# Patient Record
Sex: FEMALE | Race: WHITE | NOT HISPANIC OR LATINO | Employment: FULL TIME | URBAN - NONMETROPOLITAN AREA
[De-identification: names, ages, dates, MRNs, and addresses within clinical notes are randomized per-mention and may not be internally consistent; named-entity substitution may affect disease eponyms.]

---

## 2017-01-04 ENCOUNTER — TRANSCRIBE ORDERS (OUTPATIENT)
Dept: ADMINISTRATIVE | Facility: HOSPITAL | Age: 37
End: 2017-01-04

## 2017-01-16 ENCOUNTER — OFFICE VISIT (OUTPATIENT)
Dept: OBSTETRICS AND GYNECOLOGY | Facility: CLINIC | Age: 37
End: 2017-01-16

## 2017-01-16 VITALS
DIASTOLIC BLOOD PRESSURE: 88 MMHG | SYSTOLIC BLOOD PRESSURE: 134 MMHG | WEIGHT: 199 LBS | BODY MASS INDEX: 31.23 KG/M2 | HEIGHT: 67 IN

## 2017-01-16 DIAGNOSIS — N97.0 INFERTILITY ASSOCIATED WITH ANOVULATION: ICD-10-CM

## 2017-01-16 DIAGNOSIS — E28.2 PCOS (POLYCYSTIC OVARIAN SYNDROME): Primary | ICD-10-CM

## 2017-01-16 PROCEDURE — 99213 OFFICE O/P EST LOW 20 MIN: CPT | Performed by: OBSTETRICS & GYNECOLOGY

## 2017-01-16 NOTE — MR AVS SNAPSHOT
Marcy FELDER Brent   1/16/2017 2:45 PM   Office Visit    Dept Phone:  [phone]   Encounter #:  [id number]    Provider:  Gabriel Soliman MD   Department:  [facility]                Your Full Care Plan              Today's Medication Changes          These changes are accurate as of: 1/16/17  3:22 PM.  If you have any questions, ask your nurse or doctor.               New Medication(s)Ordered:     clomiPHENE 50 MG tablet   Commonly known as:  CLOMID   Take 1 tablet by mouth Daily. Indications: Take on cycle days 3 through 7   Started by:  Gabriel Soliman MD            Where to Get Your Medications      These medications were sent to [facility] [id number] [facility] [place], [place] - [address] AT [facility] [address] - [phone]  - [phone] FX  [address], [place] [address]     Phone:  [phone]     clomiPHENE 50 MG tablet                  Your Updated Medication List          This list is accurate as of: 1/16/17  3:22 PM.  Always use your most recent med list.                amLODIPine 5 MG tablet   Commonly known as:  NORVASC       clomiPHENE 50 MG tablet   Commonly known as:  CLOMID   Take 1 tablet by mouth Daily. Indications: Take on cycle days 3 through 7       labetalol 100 MG tablet   Commonly known as:  NORMODYNE       prenatal vitamin 27-0.8 27-0.8 MG tablet tablet               You Were Diagnosed With        Codes Comments    PCOS (polycystic ovarian syndrome)    -  Primary ICD-10-CM: E28.2  ICD-9-CM: 256.4     Infertility associated with anovulation     ICD-10-CM: N97.0  ICD-9-CM: 628.0       Instructions     None    Patient Instructions History      Upcoming Appointments     Visit Type Date Time Department    OFFICE VISIT 1/16/2017  2:45 PM DEVON NICHOLSON    OFFICE VISIT 4/17/2017  1:00 PM DEVON Villagran Signup     [facility] MyChart allows you to send messages to your doctor, view your test  "results, renew your prescriptions, schedule appointments, and more. To sign up, go to [employer] and click on the Sign Up Now link in the New User? box. Enter your [employer] Activation Code exactly as it appears below along with the last four digits of your Social Security Number and your Date of Birth () to complete the sign-up process. If you do not sign up before the expiration date, you must request a new code.    [employer] Activation Code: [email or web address]  Expires: 2017  4:33 AM    If you have questions, you can email [email or web address] or call [phone] to talk to our [employer] staff. Remember, [employer] is NOT to be used for urgent needs. For medical emergencies, dial 911.               Other Info from Your Visit           Your Appointments     2017  1:00 PM CDT   Office Visit with Gabriel Soliman MD   [facility] (--)    [address] [place] [id number] [phone]           Arrive 15 minutes prior to appointment.              Allergies     Aleve [Naproxen Sodium]        Reason for Visit     Infertility Pt and  here to discuss semen analysis results      Vital Signs     Blood Pressure Height Weight Last Menstrual Period Breastfeeding? Body Mass Index    134/88 67\" (170.2 cm) 199 lb (90.3 kg) 2016 No 31.17 kg/m2    Smoking Status                   Never Smoker           Problems and Diagnoses Noted     PCOS (polycystic ovarian syndrome)    -  Primary    Infertility associated with anovulation            "

## 2017-01-16 NOTE — PROGRESS NOTES
Subjective   Marcy Kennedy is a 36 y.o. female is here today for follow-up.  Semen analysis normal.  Reviewed history and plan again.  U/S and cycle hx lately c/w PCOS.  15 minutes spent with patient; all spent in counseling.    History of Present Illness    The following portions of the patient's history were reviewed and updated as appropriate: allergies, current medications, past family history, past medical history, past social history, past surgical history and problem list.    Review of Systems   Constitutional: Negative for fever and unexpected weight change.   HENT: Negative for postnasal drip.    Eyes: Negative for photophobia and visual disturbance.   Respiratory: Negative for cough, choking and chest tightness.    Cardiovascular: Negative for chest pain and leg swelling.   Gastrointestinal: Negative for blood in stool and constipation.   Endocrine: Negative for cold intolerance and heat intolerance.   Genitourinary: Positive for menstrual problem.   Musculoskeletal: Negative for back pain and gait problem.   Neurological: Negative for light-headedness, numbness and headaches.   Psychiatric/Behavioral: Negative for decreased concentration and dysphoric mood.       Objective   Physical Exam   Nursing note and vitals reviewed.        Assessment/Plan   Marcy was seen today for infertility.    Diagnoses and all orders for this visit:    PCOS (polycystic ovarian syndrome)    Infertility associated with anovulation    Other orders  -     clomiPHENE (CLOMID) 50 MG tablet; Take 1 tablet by mouth Daily. Indications: Take on cycle days 3 through 7

## 2017-04-17 ENCOUNTER — OFFICE VISIT (OUTPATIENT)
Dept: OBSTETRICS AND GYNECOLOGY | Facility: CLINIC | Age: 37
End: 2017-04-17

## 2017-04-17 VITALS
SYSTOLIC BLOOD PRESSURE: 120 MMHG | BODY MASS INDEX: 30.13 KG/M2 | WEIGHT: 192 LBS | DIASTOLIC BLOOD PRESSURE: 84 MMHG | HEIGHT: 67 IN

## 2017-04-17 DIAGNOSIS — Z78.9 NON-SMOKER: ICD-10-CM

## 2017-04-17 DIAGNOSIS — E28.2 PCOS (POLYCYSTIC OVARIAN SYNDROME): Primary | ICD-10-CM

## 2017-04-17 PROCEDURE — 99213 OFFICE O/P EST LOW 20 MIN: CPT | Performed by: OBSTETRICS & GYNECOLOGY

## 2017-04-17 NOTE — PROGRESS NOTES
Subjective   Marcy Kennedy is a 37 y.o. female is here today for follow-up.  Has taken 3 cycles of clomid, currently CD 10 of 3rd, with good results in terms of positive predictor kits and spontaneous menses but no pregnancy.  Discussed adding u/s to this cycle to determine quality of follicle as well endometrial thickness.    Infertility   This is a chronic problem. The current episode started more than 1 month ago. The problem occurs daily. The problem has been unchanged. Pertinent negatives include no abdominal pain, anorexia, arthralgias, change in bowel habit, chest pain, chills, congestion, coughing, diaphoresis, fatigue, fever, headaches, joint swelling, myalgias, nausea, neck pain, numbness, rash, sore throat, swollen glands, urinary symptoms, vertigo, visual change, vomiting or weakness. Nothing aggravates the symptoms. Treatments tried: clomid. The treatment provided no relief.       The following portions of the patient's history were reviewed and updated as appropriate: allergies, current medications, past family history, past medical history, past social history, past surgical history and problem list.    Review of Systems   Constitutional: Negative for chills, diaphoresis, fatigue and fever.   HENT: Negative for congestion and sore throat.    Respiratory: Negative for cough.    Cardiovascular: Negative for chest pain.   Gastrointestinal: Negative for abdominal pain, anorexia, change in bowel habit, nausea and vomiting.   Musculoskeletal: Negative for arthralgias, joint swelling, myalgias and neck pain.   Skin: Negative for rash.   Neurological: Negative for vertigo, weakness, numbness and headaches.       Objective   Physical Exam   Constitutional: She is oriented to person, place, and time. She appears well-developed and well-nourished.   Neck: Normal range of motion. Neck supple. No tracheal deviation present. No thyromegaly present.   Cardiovascular: Normal rate.    Pulmonary/Chest: Effort normal.    Abdominal: Soft. Bowel sounds are normal.   Neurological: She is alert and oriented to person, place, and time.   Skin: Skin is warm and dry.   Psychiatric: She has a normal mood and affect. Her behavior is normal. Judgment and thought content normal.   Nursing note and vitals reviewed.        Assessment/Plan   Marcy was seen today for infertility.    Diagnoses and all orders for this visit:    PCOS (polycystic ovarian syndrome)    Non-smoker      U/S this week to determine quality of follicle and EML.  Could consider adding IUI to this cycle if couple desires.    Gabriel Soliman MD

## 2017-04-20 ENCOUNTER — PROCEDURE VISIT (OUTPATIENT)
Dept: OBSTETRICS AND GYNECOLOGY | Facility: CLINIC | Age: 37
End: 2017-04-20

## 2017-04-20 DIAGNOSIS — E28.2 PCOS (POLYCYSTIC OVARIAN SYNDROME): Primary | ICD-10-CM

## 2017-04-20 PROCEDURE — 76830 TRANSVAGINAL US NON-OB: CPT | Performed by: OBSTETRICS & GYNECOLOGY

## 2017-05-19 ENCOUNTER — PROCEDURE VISIT (OUTPATIENT)
Dept: OBSTETRICS AND GYNECOLOGY | Facility: CLINIC | Age: 37
End: 2017-05-19

## 2017-05-19 DIAGNOSIS — E28.2 PCOS (POLYCYSTIC OVARIAN SYNDROME): Primary | ICD-10-CM

## 2017-05-19 PROCEDURE — 76830 TRANSVAGINAL US NON-OB: CPT | Performed by: OBSTETRICS & GYNECOLOGY

## 2017-05-23 DIAGNOSIS — N97.9 INFERTILITY, FEMALE: Primary | ICD-10-CM

## 2017-05-28 ENCOUNTER — RESULTS ENCOUNTER (OUTPATIENT)
Dept: OBSTETRICS AND GYNECOLOGY | Facility: CLINIC | Age: 37
End: 2017-05-28

## 2017-05-28 DIAGNOSIS — N97.9 INFERTILITY, FEMALE: ICD-10-CM

## 2017-06-21 ENCOUNTER — PROCEDURE VISIT (OUTPATIENT)
Dept: OBSTETRICS AND GYNECOLOGY | Facility: CLINIC | Age: 37
End: 2017-06-21

## 2017-06-21 DIAGNOSIS — E28.2 PCOS (POLYCYSTIC OVARIAN SYNDROME): Primary | ICD-10-CM

## 2017-06-21 DIAGNOSIS — N97.9 INFERTILITY, FEMALE: Primary | ICD-10-CM

## 2017-06-21 PROCEDURE — 76830 TRANSVAGINAL US NON-OB: CPT | Performed by: OBSTETRICS & GYNECOLOGY

## 2017-06-23 ENCOUNTER — OFFICE VISIT (OUTPATIENT)
Dept: OBSTETRICS AND GYNECOLOGY | Facility: CLINIC | Age: 37
End: 2017-06-23

## 2017-06-23 VITALS
SYSTOLIC BLOOD PRESSURE: 124 MMHG | WEIGHT: 188 LBS | HEIGHT: 67 IN | BODY MASS INDEX: 29.51 KG/M2 | DIASTOLIC BLOOD PRESSURE: 88 MMHG

## 2017-06-23 DIAGNOSIS — E28.2 PCOS (POLYCYSTIC OVARIAN SYNDROME): ICD-10-CM

## 2017-06-23 DIAGNOSIS — Z78.9 NON-SMOKER: ICD-10-CM

## 2017-06-23 DIAGNOSIS — N97.9 INFERTILITY, FEMALE: Primary | ICD-10-CM

## 2017-06-23 PROCEDURE — 58322 ARTIFICIAL INSEMINATION: CPT | Performed by: OBSTETRICS & GYNECOLOGY

## 2017-06-23 NOTE — PROGRESS NOTES
"Subjective   Chief Complaint   Patient presents with   • artificial insemenation     pt here today for AI. pt voices no concerns.      Marcy Kennedy is a 37 y.o. year old .  Patient's last menstrual period was 2017.  She presents to be seen for first IUI.  Patient currently on cycle day 15.  She did an hcg trigger 48 hours ago.     The following portions of the patient's history were reviewed and updated as appropriate:current medications, allergies and past surgical history    Smoking status: Never Smoker                                                              Smokeless status: Never Used                        Review of Systems   Respiratory: Negative for shortness of breath.    Cardiovascular: Negative for chest pain.   Gastrointestinal: Negative for abdominal pain.   Genitourinary: Negative for vaginal bleeding.         Objective   /88  Ht 67\" (170.2 cm)  Wt 188 lb (85.3 kg)  LMP 2017  BMI 29.44 kg/m2    Physical Exam   Constitutional: She is oriented to person, place, and time. She appears well-developed and well-nourished. No distress.   HENT:   Head: Normocephalic and atraumatic.   Pulmonary/Chest: Effort normal.   Abdominal: Soft. There is no tenderness.   Genitourinary: Vagina normal. Pelvic exam was performed with patient supine. There is no tenderness or lesion on the right labia. There is no tenderness or lesion on the left labia. Cervix exhibits no motion tenderness, no discharge and no friability. No tenderness or bleeding in the vagina. No signs of injury around the vagina. No vaginal discharge found.   Genitourinary Comments: Cx normal in appearance.  IUI catheter inserted into uterus without any difficulty, no tenaculum necessary.  Patient tolerated well.   Neurological: She is alert and oriented to person, place, and time.   Skin: Skin is warm and dry.   Psychiatric: She has a normal mood and affect. Her behavior is normal.   Nursing note and vitals " reviewed.      Lab Review   No data reviewed    Imaging   No data reviewed     Assessment & Plan    Marcy was seen today for artificial insemenation.    Diagnoses and all orders for this visit:    Infertility, female: Patient using clomid 50 mg.  She is on cycle day 15.  This is her first IUI.  Procedure performed without any difficulty.  Sperm inspected under microscope after processing and specimen had many motile sperm.  Patient instructed not to use a home UPT until day 35.    PCOS (polycystic ovarian syndrome)    Non-smoker      This note was electronically signed.    Carito Kelly MD  June 23, 2017  10:20 AM

## 2017-08-01 ENCOUNTER — OFFICE VISIT (OUTPATIENT)
Dept: OBSTETRICS AND GYNECOLOGY | Facility: CLINIC | Age: 37
End: 2017-08-01

## 2017-08-01 VITALS
SYSTOLIC BLOOD PRESSURE: 130 MMHG | BODY MASS INDEX: 29.19 KG/M2 | HEIGHT: 67 IN | DIASTOLIC BLOOD PRESSURE: 82 MMHG | WEIGHT: 186 LBS

## 2017-08-01 DIAGNOSIS — N97.9 INFERTILITY, FEMALE: ICD-10-CM

## 2017-08-01 DIAGNOSIS — Z31.9 INFERTILITY MANAGEMENT: Primary | ICD-10-CM

## 2017-08-01 PROCEDURE — 99213 OFFICE O/P EST LOW 20 MIN: CPT | Performed by: OBSTETRICS & GYNECOLOGY

## 2017-08-01 NOTE — PROGRESS NOTES
Subjective   Marcy Kennedy is a 37 y.o. female is here today for follow-up.  S/P IUI x 1 without success.  SA normal this year.  Has had positive results with clomid 50 with mature follicles.  S/P unilateral salpingectomy therefore decreasing opportunities.  Most recent cycle showed ovulation on side with in tact tube.  Had HCG and IUI 36-48 hours later.  Records reviewed including u/s images.    Infertility   This is a chronic problem. The current episode started more than 1 year ago. The problem occurs constantly. The problem has been unchanged. Pertinent negatives include no abdominal pain, anorexia, arthralgias, change in bowel habit, chest pain, chills, congestion, coughing, diaphoresis, fatigue, fever, headaches, joint swelling, myalgias, nausea, neck pain, numbness, rash, sore throat, swollen glands, urinary symptoms, vertigo, visual change, vomiting or weakness. Nothing aggravates the symptoms. Treatments tried: clomid, clomid + IUI. The treatment provided no relief.       The following portions of the patient's history were reviewed and updated as appropriate: allergies, current medications, past family history, past medical history, past social history, past surgical history and problem list.    Review of Systems   Constitutional: Negative for chills, diaphoresis, fatigue and fever.   HENT: Negative for congestion and sore throat.    Respiratory: Negative for cough.    Cardiovascular: Negative for chest pain.   Gastrointestinal: Negative for abdominal pain, anorexia, change in bowel habit, nausea and vomiting.   Musculoskeletal: Negative for arthralgias, joint swelling, myalgias and neck pain.   Skin: Negative for rash.   Neurological: Negative for vertigo, weakness, numbness and headaches.   All other systems reviewed and are negative.      Objective   Physical Exam   Constitutional: She is oriented to person, place, and time. She appears well-developed and well-nourished.   HENT:   Head: Normocephalic  and atraumatic.   Neck: Normal range of motion. Neck supple. No tracheal deviation present. No thyromegaly present.   Cardiovascular: Normal rate and regular rhythm.    Pulmonary/Chest: Effort normal and breath sounds normal.   Abdominal: Soft. Bowel sounds are normal.   Neurological: She is alert and oriented to person, place, and time.   Skin: Skin is warm and dry.   Psychiatric: She has a normal mood and affect. Her behavior is normal. Judgment and thought content normal.   Nursing note and vitals reviewed.        Assessment/Plan   Marcy was seen today for infertility.    Diagnoses and all orders for this visit:    Infertility management    Infertility, female  -     clomiPHENE (CLOMID) 50 MG tablet; Take on cycle days 3 through 7  Indications: Take on cycle days 3 through 7      Repeat cycle with IUI    Gabreil Soliman MD

## 2017-08-25 ENCOUNTER — PROCEDURE VISIT (OUTPATIENT)
Dept: OBSTETRICS AND GYNECOLOGY | Facility: CLINIC | Age: 37
End: 2017-08-25

## 2017-08-25 DIAGNOSIS — N97.9 INFERTILITY, FEMALE: Primary | ICD-10-CM

## 2017-08-25 PROCEDURE — 76830 TRANSVAGINAL US NON-OB: CPT | Performed by: OBSTETRICS & GYNECOLOGY

## 2017-08-28 ENCOUNTER — OFFICE VISIT (OUTPATIENT)
Dept: OBSTETRICS AND GYNECOLOGY | Facility: CLINIC | Age: 37
End: 2017-08-28

## 2017-08-28 VITALS
BODY MASS INDEX: 29.35 KG/M2 | SYSTOLIC BLOOD PRESSURE: 132 MMHG | HEIGHT: 67 IN | DIASTOLIC BLOOD PRESSURE: 92 MMHG | WEIGHT: 187 LBS

## 2017-08-28 DIAGNOSIS — Z31.89 ENCOUNTER FOR ARTIFICIAL INSEMINATION: Primary | ICD-10-CM

## 2017-08-28 PROCEDURE — 58323 SPERM WASHING: CPT | Performed by: OBSTETRICS & GYNECOLOGY

## 2017-08-28 PROCEDURE — 58322 ARTIFICIAL INSEMINATION: CPT | Performed by: OBSTETRICS & GYNECOLOGY

## 2017-08-28 NOTE — PROGRESS NOTES
"Marcy Kennedy is a 37 y.o. female here today for intrauterine insemination.  She had a positive LH surge yesterday by home urinary testing.  She is on Clomid ovulation induction, and gave HCG trigger 36 hours ago.  She has had her infertility workup through Dr. Soliman.  This is insemination #2.    Visit Vitals   • /92 (BP Location: Left arm, Patient Position: Sitting)   • Ht 67\" (170.2 cm)   • Wt 187 lb (84.8 kg)   • LMP 08/14/2017 (Exact Date)   • BMI 29.29 kg/m2       The patient was placed in the lithotomy position on the exam table.  The cervix was visualized with a speculum.  The cervix was probed and found to be patent.  I have prepared the semen washings according to  instructions.  The prepared semen sample, of approximately 0.5cc, was drawn up in an insemination catheter and the catheter placed transcervically to the uterine fundus.  The entire sample was placed at the uterine fundus without difficulty.  The catheter and speculum were removed.  The patient was allowed to stay in a hips elevated position for 20 minutes prior to leaving the office.  She will notify the office in a couple of weeks if she has a period or a positive pregnancy test.    "

## 2017-09-25 ENCOUNTER — PROCEDURE VISIT (OUTPATIENT)
Dept: OBSTETRICS AND GYNECOLOGY | Facility: CLINIC | Age: 37
End: 2017-09-25

## 2017-09-25 DIAGNOSIS — N97.9 INFERTILITY, FEMALE: Primary | ICD-10-CM

## 2017-09-25 PROCEDURE — 76830 TRANSVAGINAL US NON-OB: CPT | Performed by: OBSTETRICS & GYNECOLOGY

## 2017-09-26 ENCOUNTER — OFFICE VISIT (OUTPATIENT)
Dept: OBSTETRICS AND GYNECOLOGY | Facility: CLINIC | Age: 37
End: 2017-09-26

## 2017-09-26 ENCOUNTER — TELEPHONE (OUTPATIENT)
Dept: OBSTETRICS AND GYNECOLOGY | Facility: CLINIC | Age: 37
End: 2017-09-26

## 2017-09-26 VITALS
WEIGHT: 184 LBS | HEIGHT: 67 IN | DIASTOLIC BLOOD PRESSURE: 90 MMHG | SYSTOLIC BLOOD PRESSURE: 124 MMHG | BODY MASS INDEX: 28.88 KG/M2

## 2017-09-26 DIAGNOSIS — N97.0 INFERTILITY ASSOCIATED WITH ANOVULATION: Primary | ICD-10-CM

## 2017-09-26 DIAGNOSIS — I15.9 SECONDARY HYPERTENSION: ICD-10-CM

## 2017-09-26 PROCEDURE — 58323 SPERM WASHING: CPT | Performed by: NURSE PRACTITIONER

## 2017-09-26 PROCEDURE — 58322 ARTIFICIAL INSEMINATION: CPT | Performed by: NURSE PRACTITIONER

## 2017-09-26 PROCEDURE — 99213 OFFICE O/P EST LOW 20 MIN: CPT | Performed by: NURSE PRACTITIONER

## 2017-09-26 NOTE — PROGRESS NOTES
"Subjective   Marcy Kennedy is a 37 y.o. female     HPI Comments: Pt here for IUI.            /90  Ht 67\" (170.2 cm)  Wt 184 lb (83.5 kg)  LMP 09/14/2017  BMI 28.82 kg/m2      Outpatient Encounter Prescriptions as of 9/26/2017   Medication Sig Dispense Refill   • amLODIPine (NORVASC) 5 MG tablet Take 5 mg by mouth daily.     • clomiPHENE (CLOMID) 50 MG tablet Take on cycle days 3 through 7  Indications: Take on cycle days 3 through 7 5 tablet 3   • labetalol (NORMODYNE) 100 MG tablet Take 100 mg by mouth daily.     • Prenatal Vit-Fe Fumarate-FA (PRENATAL VITAMIN 27-0.8) 27-0.8 MG tablet tablet Take 1 tablet by mouth Daily.       No facility-administered encounter medications on file as of 9/26/2017.            Surgical History  Past Surgical History:   Procedure Laterality Date   • ECTOPIC PREGNANCY SURGERY Right 2003    removed part of right tube         Family History  Family History   Problem Relation Age of Onset   • Colon cancer Father    • Celiac disease Sister    • Diabetes Brother    • Celiac disease Brother    • Ovarian cancer Sister 33   • Uterine cancer Sister 33             The following portions of the patient's history were reviewed and updated as appropriate: allergies, current medications, past family history, past medical history, past social history, past surgical history and problem list.    Review of Systems   Constitutional: Negative for activity change, appetite change, chills, diaphoresis, fatigue, fever and unexpected weight change.   HENT: Negative for congestion, ear discharge, ear pain, facial swelling, hearing loss, mouth sores, nosebleeds, postnasal drip, rhinorrhea, sinus pressure, sneezing, sore throat, tinnitus, trouble swallowing and voice change.    Eyes: Negative for photophobia, pain, discharge, redness, itching and visual disturbance.   Respiratory: Negative for apnea, cough, choking, chest tightness and shortness of breath.    Cardiovascular: Negative for chest pain, " palpitations and leg swelling.   Gastrointestinal: Negative for abdominal distention, abdominal pain, anal bleeding, blood in stool, constipation, diarrhea, nausea, rectal pain and vomiting.   Endocrine: Negative for cold intolerance and heat intolerance.   Genitourinary: Negative for decreased urine volume, difficulty urinating, dyspareunia, flank pain, frequency, genital sores, hematuria, menstrual problem, pelvic pain, urgency, vaginal bleeding, vaginal discharge and vaginal pain.   Musculoskeletal: Negative for arthralgias, back pain, joint swelling and myalgias.   Skin: Negative for color change and rash.   Allergic/Immunologic: Negative for environmental allergies.   Neurological: Negative for dizziness, syncope, weakness, numbness and headaches.   Hematological: Negative for adenopathy.   Psychiatric/Behavioral: Negative for agitation, confusion and sleep disturbance. The patient is not nervous/anxious.              Objective   Physical Exam   Constitutional: She is oriented to person, place, and time. She appears well-developed and well-nourished.   HENT:   Head: Normocephalic and atraumatic.   Abdominal: Soft. She exhibits no distension. There is no tenderness.   Genitourinary: Vagina normal and uterus normal. There is no rash, tenderness, lesion or injury on the right labia. There is no rash, tenderness, lesion or injury on the left labia. Uterus is not enlarged and not tender. Cervix exhibits no motion tenderness, no discharge and no friability. Right adnexum displays no mass, no tenderness and no fullness. Left adnexum displays no mass, no tenderness and no fullness. No erythema, tenderness or bleeding in the vagina. No vaginal discharge found.   Neurological: She is alert and oriented to person, place, and time.   Skin: Skin is warm and dry.   Psychiatric: She has a normal mood and affect. Her behavior is normal. Judgment and thought content normal.   Nursing note and vitals  reviewed.        Assessment/Plan   Marcy was seen today for iui.    Diagnoses and all orders for this visit:    Infertility associated with anovulation  Comments:  see Procedure note.    Secondary hypertension  Comments:  Pt instructed to stop Norvasc as it has not been studied in Pregnancy. Will check with PCP.

## 2017-09-26 NOTE — TELEPHONE ENCOUNTER
Tried contacting pt in regards to her Norvasc. Pt had IUI today and per Agueda Venegas, pt should not be taking this medication during pregnancy. Agueda wants her to discontinue this medication and follow up with her PCP. BS

## 2017-09-26 NOTE — PROGRESS NOTES
Procedure   Procedures    Pt's partner provided sperm. Sperm was allowed to liquify for 30 minutes.  It is noted adequate sperm are visible under the microscope. Sperm is placed in the incubator after it is mixed with the appropriate sperm select solution. The sperm separation is complete after approximately 45 minutes. It is drawn up into a syringe.   Pt is placed in stirrups and a sterile speculum inserted. Cervix is dilated with cytobrush. Sperm is slowly injected into the uterus. Pt is placed in trendelenburg for 45 minutes. Will wait 2 weeks before doing a pregnancy test.

## 2017-11-14 ENCOUNTER — TELEPHONE (OUTPATIENT)
Dept: OBSTETRICS AND GYNECOLOGY | Facility: CLINIC | Age: 37
End: 2017-11-14

## 2017-11-14 DIAGNOSIS — N97.9 INFERTILITY, FEMALE: ICD-10-CM

## 2017-11-14 NOTE — TELEPHONE ENCOUNTER
Pt is calling she wants to try a 4th IUI but she wants to wait until after the holidays.  She is wanting to know if she should still be taking the Clomid monthly until then. Please advise

## 2019-08-14 ENCOUNTER — OFFICE VISIT (OUTPATIENT)
Dept: OBSTETRICS AND GYNECOLOGY | Facility: CLINIC | Age: 39
End: 2019-08-14

## 2019-08-14 VITALS
BODY MASS INDEX: 31.08 KG/M2 | HEIGHT: 67 IN | DIASTOLIC BLOOD PRESSURE: 88 MMHG | SYSTOLIC BLOOD PRESSURE: 142 MMHG | WEIGHT: 198 LBS

## 2019-08-14 DIAGNOSIS — Z31.69 ENCOUNTER FOR PRECONCEPTION CONSULTATION: Primary | ICD-10-CM

## 2019-08-14 DIAGNOSIS — Z78.9 NON-SMOKER: ICD-10-CM

## 2019-08-14 PROCEDURE — 99213 OFFICE O/P EST LOW 20 MIN: CPT | Performed by: OBSTETRICS & GYNECOLOGY

## 2019-08-14 NOTE — PROGRESS NOTES
Subjective   Marcy Kennedy is a 39 y.o. female is here today for follow-up.    Patient presents today to discuss pregnancy.    History of Present Illness     Chart, labs, and ultrasounds are reviewed.  She has had one previous pregnancy that resulted in ectopic pregnancy.  She underwent unilateral salpingectomy for this.  HSG following this, approximately 4 years ago, showed a patent left tube.    Semen analysis done 2 to 3 years ago was normal with normal motility.  Approximately 1 to 2 years ago, she had 3 cycles of intrauterine insemination without success.    She continues to have monthly menses.  Ovulation predictor kits are indicating ovulation appropriately.  They are timing intercourse appropriately.    She has no other gynecologic complaints.    The following portions of the patient's history were reviewed and updated as appropriate: allergies, current medications, past family history, past medical history, past social history, past surgical history and problem list.    Review of Systems   All other systems reviewed and are negative.      Objective   Physical Exam   Constitutional: She is oriented to person, place, and time. She appears well-developed and well-nourished.   HENT:   Head: Normocephalic and atraumatic.   Abdominal: Soft. Bowel sounds are normal.   Neurological: She is alert and oriented to person, place, and time.   Skin: Skin is warm and dry.   Psychiatric: She has a normal mood and affect. Her behavior is normal. Judgment and thought content normal.   Nursing note and vitals reviewed.        Assessment/Plan   Marcy was seen today for infertility.    Diagnoses and all orders for this visit:    Encounter for preconception consultation  -     Ambulatory Referral to Infertility    Non-smoker      No further testing is indicated locally at this point.  Will refer to Dr. Seymour for advice and recommendations.  Needs yearly exam updated.    Gabriel Soliman MD

## 2019-09-26 ENCOUNTER — OFFICE VISIT (OUTPATIENT)
Dept: OBSTETRICS AND GYNECOLOGY | Facility: CLINIC | Age: 39
End: 2019-09-26

## 2019-09-26 VITALS
WEIGHT: 202 LBS | SYSTOLIC BLOOD PRESSURE: 132 MMHG | DIASTOLIC BLOOD PRESSURE: 84 MMHG | HEIGHT: 67 IN | BODY MASS INDEX: 31.71 KG/M2

## 2019-09-26 DIAGNOSIS — Z01.419 WELL WOMAN EXAM WITH ROUTINE GYNECOLOGICAL EXAM: Primary | ICD-10-CM

## 2019-09-26 DIAGNOSIS — Z12.11 COLON CANCER SCREENING: ICD-10-CM

## 2019-09-26 DIAGNOSIS — Z80.0 FAMILY HISTORY OF COLON CANCER IN FATHER: ICD-10-CM

## 2019-09-26 DIAGNOSIS — Z12.4 CERVICAL CANCER SCREENING: ICD-10-CM

## 2019-09-26 LAB
GEN CATEG CVX/VAG CYTO-IMP: NORMAL
LAB AP CASE REPORT: NORMAL
LAB AP GYN ADDITIONAL INFORMATION: NORMAL
PATH INTERP SPEC-IMP: NORMAL
STAT OF ADQ CVX/VAG CYTO-IMP: NORMAL

## 2019-09-26 PROCEDURE — G0123 SCREEN CERV/VAG THIN LAYER: HCPCS | Performed by: NURSE PRACTITIONER

## 2019-09-26 PROCEDURE — 99395 PREV VISIT EST AGE 18-39: CPT | Performed by: NURSE PRACTITIONER

## 2019-09-26 RX ORDER — LANOLIN ALCOHOL/MO/W.PET/CERES
400 CREAM (GRAM) TOPICAL DAILY
COMMUNITY
End: 2020-10-13

## 2019-09-26 NOTE — PROGRESS NOTES
Subjective   Marcy Kennedy is a 39 y.o. female  YOB: 1980        Chief Complaint   Patient presents with   • Gynecologic Exam     Patient here for yearly exam. Last pap was 01/28/16 and was normal. Patient voices no complaints or concerns today.        Gynecologic Exam   The patient's pertinent negatives include no pelvic pain. Pertinent negatives include no abdominal pain, back pain, constipation, diarrhea, dysuria, fever, frequency, hematuria, nausea, rash, sore throat, urgency or vomiting.       The following portions of the patient's history were reviewed and updated as appropriate: allergies, current medications, past family history, past medical history, past social history, past surgical history and problem list.    Allergies   Allergen Reactions   • Aleve [Naproxen Sodium] Other (See Comments)     Blisters all over body per pt       Past Medical History:   Diagnosis Date   • Hypertension    • Polycystic ovary syndrome    • Tubal pregnancy 2003    removed part of right tube       Family History   Problem Relation Age of Onset   • Colon cancer Father    • Celiac disease Sister    • Diabetes Brother    • Celiac disease Brother    • Ovarian cancer Sister 33   • Uterine cancer Sister 33   • Breast cancer Neg Hx        Social History     Socioeconomic History   • Marital status:      Spouse name: Not on file   • Number of children: Not on file   • Years of education: Not on file   • Highest education level: Not on file   Tobacco Use   • Smoking status: Never Smoker   • Smokeless tobacco: Never Used   Substance and Sexual Activity   • Alcohol use: No     Comment: occasional   • Drug use: No   • Sexual activity: Yes     Partners: Male     Birth control/protection: None         Current Outpatient Medications:   •  folic acid (FOLVITE) 400 MCG tablet, Take 400 mcg by mouth Daily., Disp: , Rfl:   •  labetalol (NORMODYNE) 100 MG tablet, Take 100 mg by mouth daily., Disp: , Rfl:   •  Prenatal  Vit-Fe Fumarate-FA (PRENATAL VITAMIN 27-0.8) 27-0.8 MG tablet tablet, Take 1 tablet by mouth Daily., Disp: , Rfl:     No LMP recorded.    Sexual History:         Could not be calculated    Past Surgical History:   Procedure Laterality Date   • ECTOPIC PREGNANCY SURGERY Right 2003    removed part of right tube       Review of Systems   Constitutional: Negative for activity change, appetite change, fatigue, fever, unexpected weight gain and unexpected weight loss.   HENT: Negative for congestion, ear pain, hearing loss, nosebleeds, rhinorrhea, sore throat, tinnitus and trouble swallowing.    Eyes: Negative for blurred vision, pain, discharge, itching and visual disturbance.   Respiratory: Negative for apnea, chest tightness, shortness of breath and wheezing.    Cardiovascular: Negative for chest pain and leg swelling.   Gastrointestinal: Negative for abdominal pain, blood in stool, constipation, diarrhea, nausea, vomiting and GERD.   Endocrine: Negative for heat intolerance, polydipsia and polyuria.   Genitourinary: Negative for breast lump, decreased libido, difficulty urinating, dyspareunia, dysuria, frequency, genital sores, hematuria, menstrual problem, pelvic pain, urgency, urinary incontinence and vaginal pain.   Musculoskeletal: Negative for arthralgias, back pain, joint swelling and myalgias.   Skin: Negative for color change, rash and skin lesions.   Allergic/Immunologic: Negative for environmental allergies, food allergies and immunocompromised state.   Neurological: Negative for dizziness, tremors, seizures, syncope, facial asymmetry, numbness and headache.   Hematological: Negative for adenopathy. Does not bruise/bleed easily.   Psychiatric/Behavioral: Negative for agitation, hallucinations, sleep disturbance, suicidal ideas and depressed mood. The patient is not nervous/anxious.        Objective   Physical Exam   Constitutional: She is oriented to person, place, and time. She appears well-developed and  well-nourished. No distress.   HENT:   Head: Normocephalic.   Right Ear: External ear normal.   Left Ear: External ear normal.   Nose: Nose normal.   Mouth/Throat: Oropharynx is clear and moist.   Eyes: Conjunctivae are normal. Right eye exhibits no discharge. Left eye exhibits no discharge. No scleral icterus.   Neck: Normal range of motion. Neck supple. Carotid bruit is not present. No tracheal deviation present. No thyromegaly present.   Cardiovascular: Normal rate, regular rhythm, normal heart sounds and intact distal pulses.   No murmur heard.  Pulmonary/Chest: Effort normal and breath sounds normal. No respiratory distress. She has no wheezes. Right breast exhibits no inverted nipple, no mass, no nipple discharge, no skin change and no tenderness. Left breast exhibits no inverted nipple, no mass, no nipple discharge, no skin change and no tenderness. Breasts are symmetrical. There is no breast swelling.   Abdominal: Soft. She exhibits no distension and no mass. There is no tenderness. There is no guarding. No hernia. Hernia confirmed negative in the right inguinal area and confirmed negative in the left inguinal area.   Genitourinary: Rectum normal, vagina normal and uterus normal. Rectal exam shows no mass. No breast tenderness, discharge or bleeding. Pelvic exam was performed with patient supine. There is no rash, tenderness, lesion or injury on the right labia. There is no rash, tenderness, lesion or injury on the left labia. Uterus is not enlarged, not fixed and not tender. Cervix exhibits no motion tenderness, no discharge and no friability. Right adnexum displays no mass, no tenderness and no fullness. Left adnexum displays no mass, no tenderness and no fullness. No erythema, tenderness or bleeding in the vagina. No foreign body in the vagina. No signs of injury around the vagina. No vaginal discharge found.   Genitourinary Comments:   BSU normal  Urethral meatus  Normal  Perineum  Normal  "  Musculoskeletal: Normal range of motion. She exhibits no edema or tenderness.   Lymphadenopathy:        Head (right side): No submental, no submandibular, no tonsillar, no preauricular, no posterior auricular and no occipital adenopathy present.        Head (left side): No submental, no submandibular, no tonsillar, no preauricular, no posterior auricular and no occipital adenopathy present.     She has no cervical adenopathy.        Right cervical: No superficial cervical, no deep cervical and no posterior cervical adenopathy present.       Left cervical: No superficial cervical, no deep cervical and no posterior cervical adenopathy present.     She has no axillary adenopathy.        Right: No inguinal adenopathy present.        Left: No inguinal adenopathy present.   Neurological: She is alert and oriented to person, place, and time. Coordination normal.   Skin: Skin is warm and dry. No bruising and no rash noted. She is not diaphoretic. No erythema.   Psychiatric: She has a normal mood and affect. Her behavior is normal. Judgment and thought content normal.   Nursing note and vitals reviewed.        Vitals:    09/26/19 0800   BP: 132/84   Weight: 91.6 kg (202 lb)   Height: 170.2 cm (67\")       Marcy was seen today for gynecologic exam.    Diagnoses and all orders for this visit:    Well woman exam with routine gynecological exam  Comments:  Normal well woman exam.  ThinPrep Pap smear done.  Baseline mammogram was done in 2016-will start annual mammograms next year.    Cervical cancer screening  Comments:  ThinPrep Pap smear done.    Colon cancer screening  Comments:  Referral made to gastroenterology for baseline colonoscopy.  Patient's father had colon cancer in his 40s.  Patient has never had a colonoscopy.  Referral made.  Orders:  -     Ambulatory Referral to Gastroenterology    Family history of colon cancer in father  -     Ambulatory Referral to Gastroenterology    Other orders  -     Liquid-based Pap " Smear, Screening          Patient's Body mass index is 31.64 kg/m². BMI is above normal parameters. Recommendations include: exercise counseling and nutrition counseling.             Non-Smoker    MyChart Instructions Given

## 2019-10-17 ENCOUNTER — OFFICE VISIT (OUTPATIENT)
Dept: GASTROENTEROLOGY | Facility: CLINIC | Age: 39
End: 2019-10-17

## 2019-10-17 VITALS
DIASTOLIC BLOOD PRESSURE: 88 MMHG | TEMPERATURE: 98.6 F | OXYGEN SATURATION: 99 % | WEIGHT: 200 LBS | BODY MASS INDEX: 31.39 KG/M2 | HEART RATE: 80 BPM | HEIGHT: 67 IN | SYSTOLIC BLOOD PRESSURE: 120 MMHG

## 2019-10-17 DIAGNOSIS — Z80.0 FAMILY HX OF COLON CANCER: Primary | ICD-10-CM

## 2019-10-17 PROCEDURE — S0285 CNSLT BEFORE SCREEN COLONOSC: HCPCS | Performed by: CLINICAL NURSE SPECIALIST

## 2019-10-17 RX ORDER — SODIUM, POTASSIUM,MAG SULFATES 17.5-3.13G
SOLUTION, RECONSTITUTED, ORAL ORAL
Qty: 2 BOTTLE | Refills: 0 | Status: SHIPPED | OUTPATIENT
Start: 2019-10-17 | End: 2020-10-13

## 2019-10-17 NOTE — PROGRESS NOTES
Marcy Kennedy  1980      10/17/2019  Chief Complaint   Patient presents with   • Colon Cancer Screening     family hx of colon cancer- father: sister had celiac      Subjective   HPI  Marcy Kennedy is a 39 y.o. female who presents as a referral for preventative maintenance. She has no complaints of nausea or vomiting. No change in bowels. No wt loss. No BRBPR. No melena. There is positive family hx for colon cancer in his mid 40s. No abdominal pain.  Past Medical History:   Diagnosis Date   • Hypertension    • Polycystic ovary syndrome    • Tubal pregnancy 2003    removed part of right tube     Past Surgical History:   Procedure Laterality Date   • ECTOPIC PREGNANCY SURGERY Right 2003    removed part of right tube     Outpatient Medications Marked as Taking for the 10/17/19 encounter (Office Visit) with Ethel Ford APRN   Medication Sig Dispense Refill   • folic acid (FOLVITE) 400 MCG tablet Take 400 mcg by mouth Daily.     • labetalol (NORMODYNE) 100 MG tablet Take 100 mg by mouth daily.     • Prenatal Vit-Fe Fumarate-FA (PRENATAL VITAMIN 27-0.8) 27-0.8 MG tablet tablet Take 1 tablet by mouth Daily.       Allergies   Allergen Reactions   • Aleve [Naproxen Sodium] Other (See Comments)     Blisters all over body per pt     Social History     Socioeconomic History   • Marital status:      Spouse name: Not on file   • Number of children: Not on file   • Years of education: Not on file   • Highest education level: Not on file   Tobacco Use   • Smoking status: Never Smoker   • Smokeless tobacco: Never Used   Substance and Sexual Activity   • Alcohol use: No     Frequency: Never   • Drug use: No   • Sexual activity: Yes     Partners: Male     Birth control/protection: None     Family History   Problem Relation Age of Onset   • Colon cancer Father    • Celiac disease Sister    • Diabetes Brother    • Celiac disease Brother    • Ovarian cancer Sister 33   • Uterine cancer Sister 33   • Breast cancer  "Neg Hx      Health Maintenance   Topic Date Due   • ANNUAL PHYSICAL  03/12/1983   • TDAP/TD VACCINES (1 - Tdap) 03/12/1999   • INFLUENZA VACCINE  08/01/2019   • PAP SMEAR  09/26/2022       REVIEW OF SYSTEMS  General: well appearing, no fever chills or sweats, no unexplained wt loss  HEENT: no acute visual or hearing disturbances  Cardiovascular: No chest pain or palpitations  Pulmonary: No shortness of breath, coughing, wheezing or hemoptysis  : No burning, urgency, hematuria, or dysuria  Musculoskeletal: No joint pain or stiffness  Peripheral: no edema  Skin: No lesions or rashes  Neuro: No dizziness, headaches, stroke, syncope  Endocrine: No hot or cold intolerances  Hematological: No blood dyscrasias    Objective   Vitals:    10/17/19 0922   BP: 120/88   Pulse: 80   Temp: 98.6 °F (37 °C)   SpO2: 99%   Weight: 90.7 kg (200 lb)   Height: 170.2 cm (67.01\")     Body mass index is 31.32 kg/m².  Patient's Body mass index is 31.32 kg/m². BMI is above normal parameters. Recommendations include: nutrition counseling.      PHYSICAL EXAM  General: age appropriate well nourished well appearing, no acute distress  Head: normocephalic and atraumatic  Global assessment-supple  Neck-No JVD noted, no lymphadenopathy  Pulmonary-clear to auscultation bilaterally, normal respiratory effort  Cardiovascular-normal rate and rhythm, normal heart sounds, S1 and S2 noted  Abdomen-soft, non tender, non distended, normal bowel sounds all 4 quadrants, no hepatosplenomegaly noted  Extremities-No clubbing cyanosis or edema  Neuro-Non focal, converses appropriately, awake, alert, oriented    Assessment/Plan     Marcy was seen today for colon cancer screening.    Diagnoses and all orders for this visit:    Family hx of colon cancer  Comments:  father diagnosed in his mid 40s with colon cancer  Orders:  -     Case Request; Standing  -     Follow Anesthesia Guidelines / Standing Orders; Future  -     Obtain Informed Consent; Future  -     " Implement Anesthesia Orders Day of Procedure; Standing  -     Obtain Informed Consent; Standing  -     Verify bowel prep was successful; Standing  -     Case Request  -     SUPREP BOWEL PREP KIT 17.5-3.13-1.6 GM/177ML solution oral solution; Take as directed by office instructions provided        COLONOSCOPY WITH ANESTHESIA (N/A)  Body mass index is 31.32 kg/m².    Patient instructions on prep prior to procedure provided to the patient.    All risks, benefits, alternatives, and indications of colonoscopy procedure have been discussed with the patient. Risks to include perforation of the colon requiring possible surgery or colostomy, risk of bleeding from biopsies or removal of colon tissue, possibility of missing a colon polyp or cancer, or adverse drug reaction.  Benefits to include the diagnosis and management of disease of the colon and rectum. Alternatives to include barium enema, radiographic evaluation, lab testing or no intervention. Pt verbalizes understanding and agrees.     Ethel Ford, SHARON  10/17/2019  9:47 AM      IF YOU SMOKE OR USE TOBACCO PLEASE READ THE FOLLOWIN minutes reading provided    Why is smoking bad for me?  Smoking increases the risk of heart disease, lung disease, vascular disease, stroke, and cancer.     If you smoke, STOP!    If you would like more information on quitting smoking, please visit the [employer] website: www.[email or web address]/healthier-[email or web address]/smoke   This link will provide additional resources including the QUIT line and the Beat the Pack support groups.     For more information:    Quit Now [place]  -QUIT-NOW  https://[place].quitlogix.org/en-[place]/    Obesity, Adult  Obesity is the condition of having too much total body fat. Being overweight or obese means that your weight is greater than what is considered healthy for your body size. Obesity is determined by a measurement called BMI. BMI is an estimate of body fat and is  calculated from height and weight. For adults, a BMI of 30 or higher is considered obese.  Obesity can eventually lead to other health concerns and major illnesses, including:  · Stroke.  · Coronary artery disease (CAD).  · Type 2 diabetes.  · Some types of cancer, including cancers of the colon, breast, uterus, and gallbladder.  · Osteoarthritis.  · High blood pressure (hypertension).  · High cholesterol.  · Sleep apnea.  · Gallbladder stones.  · Infertility problems.  What are the causes?  The main cause of obesity is taking in (consuming) more calories than your body uses for energy. Other factors that contribute to this condition may include:  · Being born with genes that make you more likely to become obese.  · Having a medical condition that causes obesity. These conditions include:  ¨ Hypothyroidism.  ¨ Polycystic ovarian syndrome (PCOS).  ¨ Binge-eating disorder.  ¨ Cushing syndrome.  · Taking certain medicines, such as steroids, antidepressants, and seizure medicines.  · Not being physically active (sedentary lifestyle).  · Living where there are limited places to exercise safely or buy healthy foods.  · Not getting enough sleep.  What increases the risk?  The following factors may increase your risk of this condition:  · Having a family history of obesity.  · Being a woman of -American descent.  · Being a man of  descent.  What are the signs or symptoms?  Having excessive body fat is the main symptom of this condition.  How is this diagnosed?  This condition may be diagnosed based on:  · Your symptoms.  · Your medical history.  · A physical exam. Your health care provider may measure:  ¨ Your BMI. If you are an adult with a BMI between 25 and less than 30, you are considered overweight. If you are an adult with a BMI of 30 or higher, you are considered obese.  ¨ The distances around your hips and your waist (circumferences). These may be compared to each other to help diagnose your  condition.  ¨ Your skinfold thickness. Your health care provider may gently pinch a fold of your skin and measure it.  How is this treated?  Treatment for this condition often includes changing your lifestyle. Treatment may include some or all of the following:  · Dietary changes. Work with your health care provider and a dietitian to set a weight-loss goal that is healthy and reasonable for you. Dietary changes may include eating:  ¨ Smaller portions. A portion size is the amount of a particular food that is healthy for you to eat at one time. This varies from person to person.  ¨ Low-calorie or low-fat options.  ¨ More whole grains, fruits, and vegetables.  · Regular physical activity. This may include aerobic activity (cardio) and strength training.  · Medicine to help you lose weight. Your health care provider may prescribe medicine if you are unable to lose 1 pound a week after 6 weeks of eating more healthily and doing more physical activity.  · Surgery. Surgical options may include gastric banding and gastric bypass. Surgery may be done if:  ¨ Other treatments have not helped to improve your condition.  ¨ You have a BMI of 40 or higher.  ¨ You have life-threatening health problems related to obesity.  Follow these instructions at home:     Eating and drinking     · Follow recommendations from your health care provider about what you eat and drink. Your health care provider may advise you to:  ¨ Limit fast foods, sweets, and processed snack foods.  ¨ Choose low-fat options, such as low-fat milk instead of whole milk.  ¨ Eat 5 or more servings of fruits or vegetables every day.  ¨ Eat at home more often. This gives you more control over what you eat.  ¨ Choose healthy foods when you eat out.  ¨ Learn what a healthy portion size is.  ¨ Keep low-fat snacks on hand.  ¨ Avoid sugary drinks, such as soda, fruit juice, iced tea sweetened with sugar, and flavored milk.  ¨ Eat a healthy breakfast.  · Drink enough water  to keep your urine clear or pale yellow.  · Do not go without eating for long periods of time (do not fast) or follow a fad diet. Fasting and fad diets can be unhealthy and even dangerous.  Physical Activity   · Exercise regularly, as told by your health care provider. Ask your health care provider what types of exercise are safe for you and how often you should exercise.  · Warm up and stretch before being active.  · Cool down and stretch after being active.  · Rest between periods of activity.  Lifestyle   · Limit the time that you spend in front of your TV, computer, or video game system.  · Find ways to reward yourself that do not involve food.  · Limit alcohol intake to no more than 1 drink a day for nonpregnant women and 2 drinks a day for men. One drink equals 12 oz of beer, 5 oz of wine, or 1½ oz of hard liquor.  General instructions   · Keep a weight loss journal to keep track of the food you eat and how much you exercise you get.  · Take over-the-counter and prescription medicines only as told by your health care provider.  · Take vitamins and supplements only as told by your health care provider.  · Consider joining a support group. Your health care provider may be able to recommend a support group.  · Keep all follow-up visits as told by your health care provider. This is important.  Contact a health care provider if:  · You are unable to meet your weight loss goal after 6 weeks of dietary and lifestyle changes.  This information is not intended to replace advice given to you by your health care provider. Make sure you discuss any questions you have with your health care provider.  Document Released: 01/25/2006 Document Revised: 05/22/2017 Document Reviewed: 10/05/2016  [employer] Patient Education © 2017 [employer]

## 2019-10-18 PROBLEM — Z80.0 FAMILY HX OF COLON CANCER: Status: ACTIVE | Noted: 2019-10-18

## 2020-01-02 ENCOUNTER — ANESTHESIA (OUTPATIENT)
Dept: GASTROENTEROLOGY | Facility: HOSPITAL | Age: 40
End: 2020-01-02

## 2020-01-02 ENCOUNTER — ANESTHESIA EVENT (OUTPATIENT)
Dept: GASTROENTEROLOGY | Facility: HOSPITAL | Age: 40
End: 2020-01-02

## 2020-01-02 ENCOUNTER — HOSPITAL ENCOUNTER (OUTPATIENT)
Facility: HOSPITAL | Age: 40
Setting detail: HOSPITAL OUTPATIENT SURGERY
Discharge: HOME | End: 2020-01-02
Attending: INTERNAL MEDICINE | Admitting: INTERNAL MEDICINE

## 2020-01-02 VITALS
WEIGHT: 199 LBS | SYSTOLIC BLOOD PRESSURE: 119 MMHG | TEMPERATURE: 98.1 F | HEIGHT: 67 IN | HEART RATE: 100 BPM | BODY MASS INDEX: 31.23 KG/M2 | DIASTOLIC BLOOD PRESSURE: 92 MMHG | OXYGEN SATURATION: 97 % | RESPIRATION RATE: 20 BRPM

## 2020-01-02 DIAGNOSIS — Z80.0 FAMILY HX OF COLON CANCER: ICD-10-CM

## 2020-01-02 LAB — B-HCG UR QL: NEGATIVE

## 2020-01-02 PROCEDURE — 25010000002 PROPOFOL 10 MG/ML EMULSION: Performed by: NURSE ANESTHETIST, CERTIFIED REGISTERED

## 2020-01-02 PROCEDURE — 88305 TISSUE EXAM BY PATHOLOGIST: CPT | Performed by: INTERNAL MEDICINE

## 2020-01-02 PROCEDURE — 81025 URINE PREGNANCY TEST: CPT | Performed by: ANESTHESIOLOGY

## 2020-01-02 PROCEDURE — 45385 COLONOSCOPY W/LESION REMOVAL: CPT | Performed by: INTERNAL MEDICINE

## 2020-01-02 RX ORDER — PROPOFOL 10 MG/ML
VIAL (ML) INTRAVENOUS AS NEEDED
Status: DISCONTINUED | OUTPATIENT
Start: 2020-01-02 | End: 2020-01-02 | Stop reason: SURG

## 2020-01-02 RX ORDER — SODIUM CHLORIDE 9 MG/ML
500 INJECTION, SOLUTION INTRAVENOUS CONTINUOUS PRN
Status: DISCONTINUED | OUTPATIENT
Start: 2020-01-02 | End: 2020-01-02 | Stop reason: HOSPADM

## 2020-01-02 RX ORDER — SODIUM CHLORIDE 0.9 % (FLUSH) 0.9 %
10 SYRINGE (ML) INJECTION AS NEEDED
Status: DISCONTINUED | OUTPATIENT
Start: 2020-01-02 | End: 2020-01-02 | Stop reason: HOSPADM

## 2020-01-02 RX ADMIN — PROPOFOL 50 MG: 10 INJECTION, EMULSION INTRAVENOUS at 09:40

## 2020-01-02 RX ADMIN — PROPOFOL 50 MG: 10 INJECTION, EMULSION INTRAVENOUS at 09:41

## 2020-01-02 RX ADMIN — SODIUM CHLORIDE 500 ML: 9 INJECTION, SOLUTION INTRAVENOUS at 08:23

## 2020-01-02 RX ADMIN — PROPOFOL 50 MG: 10 INJECTION, EMULSION INTRAVENOUS at 09:51

## 2020-01-02 RX ADMIN — LIDOCAINE HYDROCHLORIDE 20 MG: 20 INJECTION, SOLUTION INTRAVENOUS at 09:40

## 2020-01-02 RX ADMIN — PROPOFOL 50 MG: 10 INJECTION, EMULSION INTRAVENOUS at 09:47

## 2020-01-02 RX ADMIN — PROPOFOL 50 MG: 10 INJECTION, EMULSION INTRAVENOUS at 09:49

## 2020-01-02 RX ADMIN — PROPOFOL 50 MG: 10 INJECTION, EMULSION INTRAVENOUS at 09:44

## 2020-01-02 RX ADMIN — PROPOFOL 50 MG: 10 INJECTION, EMULSION INTRAVENOUS at 09:43

## 2020-01-02 RX ADMIN — PROPOFOL 50 MG: 10 INJECTION, EMULSION INTRAVENOUS at 09:46

## 2020-01-02 RX ADMIN — PROPOFOL 50 MG: 10 INJECTION, EMULSION INTRAVENOUS at 09:53

## 2020-01-02 RX ADMIN — PROPOFOL 50 MG: 10 INJECTION, EMULSION INTRAVENOUS at 09:39

## 2020-01-02 NOTE — H&P
"[facility] Gastroenterology  Pre Procedure History & Physical    Chief Complaint:   Family history    Subjective     HPI:   Here for colonoscopy.  Family history to first-degree relative to the age of 50.    Past Medical History:   Past Medical History:   Diagnosis Date   • Hypertension    • Polycystic ovary syndrome    • Tubal pregnancy 2003    removed part of right tube       Past Surgical History:  Past Surgical History:   Procedure Laterality Date   • ECTOPIC PREGNANCY SURGERY Right 2003    removed part of right tube       Family History:  Family History   Problem Relation Age of Onset   • Colon cancer Father    • Celiac disease Sister    • Diabetes Brother    • Celiac disease Brother    • Ovarian cancer Sister 33   • Uterine cancer Sister 33   • Breast cancer Neg Hx        Social History:   reports that she has never smoked. She has never used smokeless tobacco. She reports that she does not drink alcohol or use drugs.    Medications:   Prior to Admission medications    Medication Sig Start Date End Date Taking? Authorizing Provider   folic acid (FOLVITE) 400 MCG tablet Take 400 mcg by mouth Daily.   Yes Nisha Luke MD   Prenatal Vit-Fe Fumarate-FA (PRENATAL VITAMIN 27-0.8) 27-0.8 MG tablet tablet Take 1 tablet by mouth Daily.   Yes Nisha Luke MD   SUPREP BOWEL PREP KIT 17.5-3.13-1.6 GM/177ML solution oral solution Take as directed by office instructions provided 10/17/19  Yes Ethel Ford APRN   labetalol (NORMODYNE) 100 MG tablet Take 100 mg by mouth daily.    ProviderNisha MD       Allergies:  Aleve [naproxen sodium]    Objective     Blood pressure (!) 150/103, pulse 116, temperature 98.1 °F (36.7 °C), temperature source Temporal, resp. rate 18, height 170.2 cm (67\"), weight 90.3 kg (199 lb), SpO2 100 %, not currently breastfeeding.    Physical Exam   Constitutional: Pt is oriented to person, place, and in no distress.   HENT: Mouth/Throat: Oropharynx is clear. "   Cardiovascular: Normal rate, regular rhythm.    Pulmonary/Chest: Effort normal. No respiratory distress. No  wheezes.   Abdominal: Soft. Non-distended.  Skin: Skin is warm and dry.   Psychiatric: Mood, memory, affect and judgment appear normal.     Assessment/Plan     Diagnosis:  Family history of colon cancer    Anticipated Surgical Procedure:    Proceed with colonoscopy as scheduled    The following major R/B/A were discussed with the patient, however the list is not all inclusive . Risk:  Bleeding (immediate and delayed), perforation (rupture or tear), reaction to medication, missed lesion/cancer, pain during the procedure, infection, need for surgery, need for ostomy, need for mechanical ventilation (breathing machine), death.  Benefits: removal of polyp/tissue, burn/clip/or inject to stop bleeding, removal of foreign body, dilate any stricture.  Alternatives: Xray or CT, surgery, do nothing with associated risk   The patient was given time to ask question and received explanation, and agrees to proceed as per History and Physical.   No guarantee given or expressed.    EMR Dragon/transcription disclaimer: Much of this encounter note is an electronic transcription/translation of spoken language to printed text.  The electronic translation of spoken language may permit erroneous, or at times, nonsensical words or phrases to be inadvertently transcribed.  Although I have reviewed the note for such errors, some may still exist.    Roger Romero MD  9:39 AM  1/2/2020

## 2020-01-02 NOTE — ANESTHESIA PREPROCEDURE EVALUATION
Anesthesia Evaluation     Patient summary reviewed and Nursing notes reviewed   no history of anesthetic complications:  NPO Solid Status: > 8 hours  NPO Liquid Status: > 2 hours           Airway   Mallampati: II  TM distance: >3 FB  Neck ROM: full  No difficulty expected  Dental      Pulmonary    (-) sleep apnea, not a smoker  Cardiovascular   Exercise tolerance: good (4-7 METS)    (+) hypertension,   (-) CAD, cardiac stents      Neuro/Psych  (-) seizures, TIA, CVA  GI/Hepatic/Renal/Endo    (-) liver disease, no renal disease, diabetes    Musculoskeletal     Abdominal    Substance History      OB/GYN          Other                        Anesthesia Plan    ASA 2     MAC     intravenous induction     Anesthetic plan, all risks, benefits, and alternatives have been provided, discussed and informed consent has been obtained with: patient.

## 2020-01-02 NOTE — ANESTHESIA POSTPROCEDURE EVALUATION
Patient: Marcy Kennedy    Procedure Summary     Date:  01/02/20 Room / Location:  [facility] ENDOSCOPY 2 / [facility] ENDOSCOPY    Anesthesia Start:  0938 Anesthesia Stop:  0956    Procedure:  COLONOSCOPY WITH ANESTHESIA (N/A ) Diagnosis:       Family hx of colon cancer      (Family hx of colon cancer [Z80.0])    Surgeon:  Roger Romero MD Provider:  JIMI Alvarez CRNA    Anesthesia Type:  MAC ASA Status:  2          Anesthesia Type: MAC    Vitals  No vitals data found for the desired time range.          Post Anesthesia Care and Evaluation    Patient location during evaluation: PACU  Patient participation: complete - patient participated  Level of consciousness: awake and alert  Pain score: 0  Pain management: adequate  Airway patency: patent  Anesthetic complications: No anesthetic complications    Cardiovascular status: acceptable and stable  Respiratory status: acceptable and unassisted  Hydration status: acceptable

## 2020-01-07 LAB
CYTO UR: NORMAL
LAB AP CASE REPORT: NORMAL
LAB AP CLINICAL INFORMATION: NORMAL
PATH REPORT.FINAL DX SPEC: NORMAL
PATH REPORT.GROSS SPEC: NORMAL

## 2020-10-13 ENCOUNTER — OFFICE VISIT (OUTPATIENT)
Dept: OBSTETRICS AND GYNECOLOGY | Facility: CLINIC | Age: 40
End: 2020-10-13

## 2020-10-13 VITALS
HEIGHT: 67 IN | SYSTOLIC BLOOD PRESSURE: 116 MMHG | BODY MASS INDEX: 31.08 KG/M2 | DIASTOLIC BLOOD PRESSURE: 76 MMHG | WEIGHT: 198 LBS

## 2020-10-13 DIAGNOSIS — Z31.9 PATIENT DESIRES PREGNANCY: ICD-10-CM

## 2020-10-13 DIAGNOSIS — E28.2 PCOS (POLYCYSTIC OVARIAN SYNDROME): ICD-10-CM

## 2020-10-13 DIAGNOSIS — Z12.4 CERVICAL CANCER SCREENING: ICD-10-CM

## 2020-10-13 DIAGNOSIS — Z12.31 ENCOUNTER FOR SCREENING MAMMOGRAM FOR MALIGNANT NEOPLASM OF BREAST: ICD-10-CM

## 2020-10-13 DIAGNOSIS — Z01.419 WELL WOMAN EXAM WITH ROUTINE GYNECOLOGICAL EXAM: Primary | ICD-10-CM

## 2020-10-13 PROCEDURE — G0123 SCREEN CERV/VAG THIN LAYER: HCPCS

## 2020-10-13 PROCEDURE — 99396 PREV VISIT EST AGE 40-64: CPT | Performed by: NURSE PRACTITIONER

## 2020-10-13 PROCEDURE — 99213 OFFICE O/P EST LOW 20 MIN: CPT | Performed by: NURSE PRACTITIONER

## 2020-10-13 PROCEDURE — 87624 HPV HI-RISK TYP POOLED RSLT: CPT | Performed by: NURSE PRACTITIONER

## 2020-10-13 NOTE — PROGRESS NOTES
Subjective   Marcy Kennedy is a 40 y.o. female  YOB: 1980        Chief Complaint   Patient presents with   • Gynecologic Exam     Patient here for yearly exam. Last exam was done 09/26/19 and was normal. Patient needs order for screening mammo, had baseline in 2016 at [facility]. Patient has concerns about trying to conceive. Patient states that hormones seem to be changing. Patient seems to have more pressure during ovulation time as well. Patient also has sharp pain in her left side during ovulation time.        Gynecologic Exam  The patient's pertinent negatives include no pelvic pain. Pertinent negatives include no abdominal pain, back pain, constipation, diarrhea, dysuria, fever, frequency, hematuria, nausea, rash, sore throat, urgency or vomiting.       The following portions of the patient's history were reviewed and updated as appropriate: allergies, current medications, past family history, past medical history, past social history, past surgical history and problem list.    Allergies   Allergen Reactions   • Aleve [Naproxen Sodium] Other (See Comments)     Blisters all over body per pt       Past Medical History:   Diagnosis Date   • Hypertension    • Polycystic ovary syndrome    • Tubal pregnancy 2003    removed part of right tube       Family History   Problem Relation Age of Onset   • Colon cancer Father    • Celiac disease Sister    • Diabetes Brother    • Celiac disease Brother    • Ovarian cancer Sister 33   • Uterine cancer Sister 33   • Breast cancer Neg Hx        Social History     Socioeconomic History   • Marital status:      Spouse name: Not on file   • Number of children: Not on file   • Years of education: Not on file   • Highest education level: Not on file   Tobacco Use   • Smoking status: Never Smoker   • Smokeless tobacco: Never Used   Substance and Sexual Activity   • Alcohol use: No     Frequency: Never   • Drug use: No   • Sexual activity: Yes     Partners: Male      Birth control/protection: None         Current Outpatient Medications:   •  labetalol (NORMODYNE) 100 MG tablet, Take 100 mg by mouth daily., Disp: , Rfl:   •  Multiple Vitamins-Minerals (MULTIVITAMIN ADULT PO), Take  by mouth., Disp: , Rfl:     Patient's last menstrual period was 10/01/2020 (exact date).    Sexual History:         Could not be calculated    Past Surgical History:   Procedure Laterality Date   • COLONOSCOPY N/A 1/2/2020    Procedure: COLONOSCOPY WITH ANESTHESIA;  Surgeon: Roger Romero MD;  Location: [facility] ENDOSCOPY;  Service: Gastroenterology   • ECTOPIC PREGNANCY SURGERY Right 2003    removed part of right tube       Review of Systems   Constitutional: Negative for activity change, appetite change, fatigue, fever, unexpected weight gain and unexpected weight loss.   HENT: Negative for congestion, ear pain, hearing loss, nosebleeds, rhinorrhea, sore throat, tinnitus and trouble swallowing.    Eyes: Negative for blurred vision, pain, discharge, itching and visual disturbance.   Respiratory: Negative for apnea, chest tightness, shortness of breath and wheezing.    Cardiovascular: Negative for chest pain and leg swelling.   Gastrointestinal: Negative for abdominal pain, blood in stool, constipation, diarrhea, nausea, vomiting and GERD.   Endocrine: Negative for heat intolerance, polydipsia and polyuria.   Genitourinary: Negative for breast lump, decreased libido, difficulty urinating, dyspareunia, dysuria, frequency, genital sores, hematuria, menstrual problem, pelvic pain, urgency, urinary incontinence and vaginal pain.   Musculoskeletal: Negative for arthralgias, back pain, joint swelling and myalgias.   Skin: Negative for color change, rash and skin lesions.   Allergic/Immunologic: Negative for environmental allergies, food allergies and immunocompromised state.   Neurological: Negative for dizziness, tremors, seizures, syncope, facial asymmetry, numbness and headache.   Hematological: Negative  for adenopathy. Does not bruise/bleed easily.   Psychiatric/Behavioral: Negative for agitation, hallucinations, sleep disturbance, suicidal ideas and depressed mood. The patient is not nervous/anxious.        Objective   Physical Exam  Vitals signs and nursing note reviewed. Exam conducted with a chaperone present.   Constitutional:       General: She is not in acute distress.     Appearance: She is well-developed. She is not diaphoretic.   HENT:      Head: Normocephalic.      Right Ear: External ear normal.      Left Ear: External ear normal.      Nose: Nose normal.   Eyes:      General: No scleral icterus.        Right eye: No discharge.         Left eye: No discharge.      Conjunctiva/sclera: Conjunctivae normal.   Neck:      Musculoskeletal: Normal range of motion and neck supple.      Thyroid: No thyromegaly.      Vascular: No carotid bruit.      Trachea: No tracheal deviation.   Cardiovascular:      Rate and Rhythm: Normal rate and regular rhythm.      Heart sounds: Normal heart sounds. No murmur.   Pulmonary:      Effort: Pulmonary effort is normal. No respiratory distress.      Breath sounds: Normal breath sounds. No wheezing.   Chest:      Breasts: Breasts are symmetrical.         Right: No inverted nipple, mass, nipple discharge, skin change or tenderness.         Left: No inverted nipple, mass, nipple discharge, skin change or tenderness.   Abdominal:      General: There is no distension.      Palpations: Abdomen is soft. There is no mass.      Tenderness: There is no abdominal tenderness. There is no guarding.      Hernia: No hernia is present. There is no hernia in the left inguinal area or right inguinal area.   Genitourinary:     General: Normal vulva.      Exam position: Lithotomy position.      Labia:         Right: No rash, tenderness, lesion or injury.         Left: No rash, tenderness, lesion or injury.       Vagina: Normal. No signs of injury and foreign body. No vaginal discharge, erythema,  "tenderness or bleeding.      Cervix: Normal.      Uterus: Normal. Not enlarged, not fixed and not tender.       Adnexa: Right adnexa normal and left adnexa normal.        Right: No mass, tenderness or fullness.          Left: No mass, tenderness or fullness.        Rectum: Normal. No mass.      Comments:   BSU normal  Urethral meatus  Normal  Perineum  Normal  Musculoskeletal: Normal range of motion.         General: No tenderness.   Lymphadenopathy:      Head:      Right side of head: No submental, submandibular, tonsillar, preauricular, posterior auricular or occipital adenopathy.      Left side of head: No submental, submandibular, tonsillar, preauricular, posterior auricular or occipital adenopathy.      Cervical: No cervical adenopathy.      Right cervical: No superficial, deep or posterior cervical adenopathy.     Left cervical: No superficial, deep or posterior cervical adenopathy.      Lower Body: No right inguinal adenopathy. No left inguinal adenopathy.   Skin:     General: Skin is warm and dry.      Findings: No bruising, erythema or rash.   Neurological:      Mental Status: She is alert and oriented to person, place, and time.      Coordination: Coordination normal.   Psychiatric:         Mood and Affect: Mood normal.         Behavior: Behavior normal.         Thought Content: Thought content normal.         Judgment: Judgment normal.           Vitals:    10/13/20 1112   BP: 116/76   Weight: 89.8 kg (198 lb)   Height: 170.2 cm (67\")       Diagnoses and all orders for this visit:    1. Well woman exam with routine gynecological exam (Primary)  Comments:  Well woman exam.  ThinPrep Pap smear done.  Mammogram ordered.  Orders:  -     Liquid-based Pap Smear, Screening    2. Cervical cancer screening  Comments:  ThinPrep Pap smear done.  Orders:  -     Liquid-based Pap Smear, Screening    3. Encounter for screening mammogram for malignant neoplasm of breast  Comments:  Mammogram ordered.  Orders:  -     Mammo " Screening Digital Tomosynthesis Bilateral With CAD    4. PCOS (polycystic ovarian syndrome)  Comments:  Patient was told she had PCOS in the past but states she is never had any lab work done and was told this was based on her symptoms.  Does have a monthly period  Orders:  -     Comprehensive Metabolic Panel  -     Estradiol  -     Follicle Stimulating Hormone  -     Insulin, Total  -     Luteinizing Hormone  -     Progesterone  -     Testosterone  -     DHEA-Sulfate  -     Cortisol  -     Hemoglobin A1c    5. Patient desires pregnancy  Comments:  States she has had a couple of IUI's done here and had one scheduled to be done in [place] when COVID happened.  Patient is having a monthly..  States sperm analysis was normal.  Plan to go forward with the IUI in [place] at some point.  Is on a prenatal vitamin.          Patient's Body mass index is 31.01 kg/m². BMI is above normal parameters. Recommendations include: exercise counseling and nutrition counseling.             Non-Smoker    MyChart Instructions Given

## 2020-10-15 LAB
GEN CATEG CVX/VAG CYTO-IMP: NORMAL
HPV I/H RISK 4 DNA CVX QL PROBE+SIG AMP: NOT DETECTED
LAB AP CASE REPORT: NORMAL
LAB AP GYN ADDITIONAL INFORMATION: NORMAL
LAB AP GYN OTHER FINDINGS: NORMAL
PATH INTERP SPEC-IMP: NORMAL
STAT OF ADQ CVX/VAG CYTO-IMP: NORMAL

## 2020-10-16 LAB
ALBUMIN SERPL-MCNC: 4.2 G/DL (ref 3.5–5.2)
ALBUMIN/GLOB SERPL: 1.8 G/DL
ALP SERPL-CCNC: 79 U/L (ref 39–117)
ALT SERPL-CCNC: 51 U/L (ref 1–33)
AST SERPL-CCNC: 35 U/L (ref 1–32)
BILIRUB SERPL-MCNC: 0.6 MG/DL (ref 0–1.2)
BUN SERPL-MCNC: 11 MG/DL (ref 6–20)
BUN/CREAT SERPL: 11.8 (ref 7–25)
CALCIUM SERPL-MCNC: 9.3 MG/DL (ref 8.6–10.5)
CHLORIDE SERPL-SCNC: 102 MMOL/L (ref 98–107)
CO2 SERPL-SCNC: 24.3 MMOL/L (ref 22–29)
CORTIS SERPL-MCNC: 12.3 UG/DL
CREAT SERPL-MCNC: 0.93 MG/DL (ref 0.57–1)
DHEA-S SERPL-MCNC: 68.3 UG/DL (ref 57.3–279.2)
ESTRADIOL SERPL-MCNC: 190 PG/ML
FSH SERPL-ACNC: 11.3 MIU/ML
GLOBULIN SER CALC-MCNC: 2.4 GM/DL
GLUCOSE SERPL-MCNC: 231 MG/DL (ref 65–99)
HBA1C MFR BLD: 7.9 % (ref 4.8–5.6)
INSULIN SERPL-ACNC: 37.5 UIU/ML (ref 2.6–24.9)
LH SERPL-ACNC: 58.8 MIU/ML
POTASSIUM SERPL-SCNC: 4.4 MMOL/L (ref 3.5–5.2)
PROGEST SERPL-MCNC: 1 NG/ML
PROT SERPL-MCNC: 6.6 G/DL (ref 6–8.5)
SODIUM SERPL-SCNC: 137 MMOL/L (ref 136–145)
TESTOST SERPL-MCNC: 39 NG/DL (ref 8–48)

## 2020-10-20 ENCOUNTER — HOSPITAL ENCOUNTER (OUTPATIENT)
Dept: MAMMOGRAPHY | Facility: HOSPITAL | Age: 40
Discharge: HOME | End: 2020-10-20
Admitting: NURSE PRACTITIONER

## 2020-10-20 PROCEDURE — 77063 BREAST TOMOSYNTHESIS BI: CPT

## 2020-10-20 PROCEDURE — 77067 SCR MAMMO BI INCL CAD: CPT

## 2021-12-03 ENCOUNTER — TRANSCRIBE ORDERS (OUTPATIENT)
Dept: ADMINISTRATIVE | Facility: HOSPITAL | Age: 41
End: 2021-12-03

## 2021-12-03 DIAGNOSIS — Z12.31 SCREENING MAMMOGRAM, ENCOUNTER FOR: Primary | ICD-10-CM

## 2021-12-15 ENCOUNTER — DOCUMENTATION (OUTPATIENT)
Dept: MAMMOGRAPHY | Facility: HOSPITAL | Age: 41
End: 2021-12-15

## 2021-12-15 DIAGNOSIS — Z80.41 FAMILY HISTORY OF OVARIAN CANCER: Primary | ICD-10-CM

## 2021-12-15 NOTE — PROGRESS NOTES
As part of a high-risk assessment, this patient was provided a questionnaire to assess personal and family history of cancer. Patients who meet [employer] criteria are offered genetic testing for hereditary cancer at their appointment. If this patient qualifies and consents to genetic testing, the results and management recommendations (when applicable) will be provided to the referring provider.     This patient has had previous genetic testing therefore, declines testing at this time.

## 2021-12-16 ENCOUNTER — HOSPITAL ENCOUNTER (OUTPATIENT)
Dept: MAMMOGRAPHY | Facility: HOSPITAL | Age: 41
Discharge: HOME | End: 2021-12-16
Admitting: NURSE PRACTITIONER

## 2021-12-16 PROCEDURE — 77063 BREAST TOMOSYNTHESIS BI: CPT

## 2021-12-16 PROCEDURE — 77067 SCR MAMMO BI INCL CAD: CPT

## 2023-03-24 ENCOUNTER — TELEPHONE (OUTPATIENT)
Dept: OBSTETRICS AND GYNECOLOGY | Facility: CLINIC | Age: 43
End: 2023-03-24
Payer: COMMERCIAL

## 2023-03-24 NOTE — TELEPHONE ENCOUNTER
Pt called to see if we could call something in for a bladder infection, informed pt if she wanted something today she needed to go to PCP or walk-in clinic, pt voiced understanding.

## 2023-03-27 ENCOUNTER — OFFICE VISIT (OUTPATIENT)
Dept: OBSTETRICS AND GYNECOLOGY | Facility: CLINIC | Age: 43
End: 2023-03-27
Payer: COMMERCIAL

## 2023-03-27 VITALS
DIASTOLIC BLOOD PRESSURE: 86 MMHG | SYSTOLIC BLOOD PRESSURE: 118 MMHG | WEIGHT: 188 LBS | HEIGHT: 67 IN | BODY MASS INDEX: 29.51 KG/M2

## 2023-03-27 DIAGNOSIS — R39.9 UTI SYMPTOMS: Primary | ICD-10-CM

## 2023-03-27 DIAGNOSIS — E66.3 OVERWEIGHT (BMI 25.0-29.9): ICD-10-CM

## 2023-03-27 LAB
BILIRUB BLD-MCNC: NEGATIVE MG/DL
CLARITY, POC: CLEAR
COLOR UR: YELLOW
GLUCOSE UR STRIP-MCNC: NEGATIVE MG/DL
KETONES UR QL: NEGATIVE
LEUKOCYTE EST, POC: ABNORMAL
NITRITE UR-MCNC: NEGATIVE MG/ML
PH UR: 5 [PH] (ref 5–8)
PROT UR STRIP-MCNC: NEGATIVE MG/DL
RBC # UR STRIP: NEGATIVE /UL
SP GR UR: 1.02 (ref 1–1.03)
UROBILINOGEN UR QL: NORMAL

## 2023-03-27 RX ORDER — NIFEDIPINE 30 MG/1
TABLET, FILM COATED, EXTENDED RELEASE ORAL
COMMUNITY

## 2023-03-27 RX ORDER — SULFAMETHOXAZOLE AND TRIMETHOPRIM 800; 160 MG/1; MG/1
1 TABLET ORAL 2 TIMES DAILY
Qty: 6 TABLET | Refills: 0 | Status: SHIPPED | OUTPATIENT
Start: 2023-03-27

## 2023-03-27 RX ORDER — IRON,CARBONYL/ASCORBIC ACID 65MG-125MG
TABLET, DELAYED RELEASE (ENTERIC COATED) ORAL
COMMUNITY

## 2023-03-27 RX ORDER — LABETALOL 200 MG/1
TABLET, FILM COATED ORAL
COMMUNITY

## 2023-03-27 RX ORDER — INSULIN DETEMIR 100 [IU]/ML
INJECTION, SOLUTION SUBCUTANEOUS
COMMUNITY
Start: 2023-03-16

## 2023-03-27 RX ORDER — INSULIN LISPRO 200 [IU]/ML
INJECTION, SOLUTION SUBCUTANEOUS
COMMUNITY
Start: 2023-03-01

## 2023-03-27 NOTE — PROGRESS NOTES
"Chief Complaint  Urinary Tract Infection (Pt is here with c/o a possible UTI.  Symptoms started Friday with some pain in lower pelvis area then started having pretty bad pain to the left side.  Pt kept feeling like she needed to pee constantly.  Now having more achy feeling as well as pressure. )    Subjective          Marcy Kennedy presents to [facility]  History of Present Illness    The patient was in extreme pain on 03/24/2023.   She has beend drinking a lot of water and cranberry juice to flush her kidneys.   The patient is diabetic.   She drank the diet 1g of sugar cranberry juice in small bottles on 03/24/2023 through 03/26/2023, along with water.  She has been trying to stay away from processed food.   She has been eating salads mainly, protein, and vegetables.   She was eating a lot of carbohydrates until symptoms started.     She feels a lot of pressure, and her pants are feeling tighter.   She is unsure if it is inflammation.   Her bilateral sides and bilateral hips are very achy.   The patient, during ovulation, has a jelly-type discharge.   She denies any reason for STD testing.   She has back pain and hip pain with her cycles.       Review of Systems   Constitutional: Negative for activity change, appetite change, fatigue and fever.   Respiratory: Negative for apnea and shortness of breath.    Cardiovascular: Negative for chest pain and palpitations.   Gastrointestinal: Negative for abdominal distention, abdominal pain, constipation, diarrhea, nausea and vomiting.   Endocrine: Negative for cold intolerance and heat intolerance.   Genitourinary: Positive for frequency. Negative for difficulty urinating, menstrual problem, vaginal discharge and vaginal pain.   Neurological: Negative for headaches.   Psychiatric/Behavioral: Negative for agitation and sleep disturbance.         Objective   Vital Signs:   /86   Ht 170.2 cm (67\")   Wt 85.3 kg (188 lb)   BMI 29.44 kg/m²   "   Physical Exam  Vitals reviewed.   Constitutional:       Appearance: She is well-developed.   Eyes:      General:         Right eye: No discharge.         Left eye: No discharge.   Cardiovascular:      Rate and Rhythm: Normal rate and regular rhythm.   Pulmonary:      Effort: Pulmonary effort is normal.      Breath sounds: Normal breath sounds.   Skin:     General: Skin is warm.   Neurological:      Mental Status: She is alert and oriented to person, place, and time.   Psychiatric:         Behavior: Behavior normal.         Thought Content: Thought content normal.         Judgment: Judgment normal.         Result Review :                       Assessment and Plan      The patient's urinalysis will be sent for culture.   The patient will be prescribed Bactrim 2 times a day for 3 days.   She was advised to eat plain Greek yogurt, half cup, for the next 7 days.   The patient was educated on AZO, which is over-the-counter, to help with bladder spasms.     Diagnoses and all orders for this visit:    1. UTI symptoms (Primary)  -     POC Urinalysis Dipstick  -     Urine Culture - Urine, Urine, Random Void    2. Overweight (BMI 25.0-29.9)    Other orders  -     sulfamethoxazole-trimethoprim (Bactrim DS) 800-160 MG per tablet; Take 1 tablet by mouth 2 (Two) Times a Day.  Dispense: 6 tablet; Refill: 0          BMI is >= 25 and <30. (Overweight) The following options were offered after discussion;: weight loss educational material (shared in after visit summary)       Follow Up   Return for Annual physical.    Patient was given instructions and counseling regarding her condition or for health maintenance advice. Please see specific information pulled into the AVS if appropriate.     Transcribed from ambient dictation for SHARON Phipps by Taty Vu.  03/27/23   12:34 CDT    Patient or patient representative verbalized consent to the visit recording.  I have personally performed the services described in this  document as transcribed by the above individual, and it is both accurate and complete.  Whitley Ayon, APRN  3/29/2023  21:51 CDT

## 2023-03-29 LAB
BACTERIA UR CULT: NORMAL
BACTERIA UR CULT: NORMAL

## 2023-03-30 NOTE — PATIENT INSTRUCTIONS

## 2023-04-26 ENCOUNTER — OFFICE VISIT (OUTPATIENT)
Dept: OBSTETRICS AND GYNECOLOGY | Facility: CLINIC | Age: 43
End: 2023-04-26
Payer: COMMERCIAL

## 2023-04-26 VITALS
SYSTOLIC BLOOD PRESSURE: 128 MMHG | WEIGHT: 193 LBS | HEIGHT: 67 IN | DIASTOLIC BLOOD PRESSURE: 90 MMHG | BODY MASS INDEX: 30.29 KG/M2

## 2023-04-26 DIAGNOSIS — L68.0 FEMALE HIRSUTISM: ICD-10-CM

## 2023-04-26 DIAGNOSIS — Z01.419 ENCOUNTER FOR WELL WOMAN EXAM WITH ROUTINE GYNECOLOGICAL EXAM: Primary | ICD-10-CM

## 2023-04-26 DIAGNOSIS — Z12.4 ENCOUNTER FOR SCREENING FOR CERVICAL CANCER: ICD-10-CM

## 2023-04-26 DIAGNOSIS — Z12.31 ENCOUNTER FOR SCREENING MAMMOGRAM FOR BREAST CANCER: ICD-10-CM

## 2023-04-26 DIAGNOSIS — R10.2 PELVIC PAIN: ICD-10-CM

## 2023-04-26 PROCEDURE — 87624 HPV HI-RISK TYP POOLED RSLT: CPT | Performed by: NURSE PRACTITIONER

## 2023-04-26 PROCEDURE — G0123 SCREEN CERV/VAG THIN LAYER: HCPCS | Performed by: NURSE PRACTITIONER

## 2023-04-26 RX ORDER — NIFEDIPINE 30 MG/1
TABLET, EXTENDED RELEASE ORAL EVERY 24 HOURS
COMMUNITY

## 2023-04-26 NOTE — PROGRESS NOTES
Subjective   Marcy Kennedy is a 43 y.o. female  YOB: 1980        Chief Complaint   Patient presents with   • Gynecologic Exam     Patient here for annual, last pap 10/13/20, last mammogram 12/16/21,        Gynecologic Exam  The patient's pertinent negatives include no pelvic pain. Pertinent negatives include no abdominal pain, back pain, constipation, diarrhea, dysuria, fever, frequency, hematuria, nausea, rash, sore throat, urgency or vomiting.       The following portions of the patient's history were reviewed and updated as appropriate: allergies, current medications, past family history, past medical history, past social history, past surgical history, and problem list.    Allergies   Allergen Reactions   • Aleve [Naproxen Sodium] Other (See Comments)     Blisters all over body per pt   • Aleve [Naproxen] Hives   • Metformin GI Intolerance     Severe stomach cramps, vomiting and no control of bowel movements at night       Past Medical History:   Diagnosis Date   • Anxiety    • Diabetes mellitus 2018   • Female infertility Unknown   • Hypertension    • PMS (premenstrual syndrome)    • Polycystic ovary syndrome    • Tubal pregnancy 2003    removed part of right tube   • Varicella 1983       Family History   Problem Relation Age of Onset   • Colon cancer Father    • Celiac disease Sister    • Diabetes Brother    • Celiac disease Brother    • Ovarian cancer Sister    • Uterine cancer Sister    • Breast cancer Neg Hx        Social History     Socioeconomic History   • Marital status:    Tobacco Use   • Smoking status: Never   • Smokeless tobacco: Never   Substance and Sexual Activity   • Alcohol use: No   • Drug use: No   • Sexual activity: Yes     Partners: Male     Birth control/protection: None         Current Outpatient Medications:   •  Continuous Blood Gluc Sensor (FreeStyle Bar 2 Sensor) misc, , Disp: , Rfl:   •  HumaLOG KwikPen 200 UNIT/ML solution pen-injector, , Disp: , Rfl:    •  labetalol (NORMODYNE) 100 MG tablet, Take 1 tablet by mouth Daily., Disp: , Rfl:   •  labetalol (NORMODYNE) 200 MG tablet, , Disp: , Rfl:   •  Levemir FlexTouch 100 UNIT/ML injection, , Disp: , Rfl:   •  NIFEdipine XL (PROCARDIA XL) 30 MG 24 hr tablet, Daily., Disp: , Rfl:   •  Prenat-FeAsp-Meth-FA-DHA w/o A (PRENATE DHA PO), , Disp: , Rfl:     Patient's last menstrual period was 04/06/2023.    Sexual History:           Could not be calculated    Past Surgical History:   Procedure Laterality Date   • COLONOSCOPY N/A 01/02/2020    Procedure: COLONOSCOPY WITH ANESTHESIA;  Surgeon: Roger Romero MD;  Location: [facility] ENDOSCOPY;  Service: Gastroenterology   • ECTOPIC PREGNANCY SURGERY Right 2003    removed part of right tube       Review of Systems   Constitutional: Negative for activity change, appetite change, fatigue, fever, unexpected weight gain and unexpected weight loss.   HENT: Negative for congestion, ear pain, hearing loss, nosebleeds, rhinorrhea, sore throat, tinnitus and trouble swallowing.    Eyes: Negative for blurred vision, pain, discharge, itching and visual disturbance.   Respiratory: Negative for apnea, chest tightness, shortness of breath and wheezing.    Cardiovascular: Negative for chest pain and leg swelling.   Gastrointestinal: Negative for abdominal pain, blood in stool, constipation, diarrhea, nausea, vomiting and GERD.   Endocrine: Negative for heat intolerance, polydipsia and polyuria.   Genitourinary: Negative.  Negative for breast lump, decreased libido, difficulty urinating, dyspareunia, dysuria, frequency, genital sores, hematuria, menstrual problem, pelvic pain, urgency, urinary incontinence and vaginal pain.   Musculoskeletal: Negative for arthralgias, back pain, joint swelling and myalgias.   Skin: Negative for color change, rash and skin lesions.   Allergic/Immunologic: Negative for environmental allergies, food allergies and immunocompromised state.   Neurological: Negative  for dizziness, tremors, seizures, syncope, facial asymmetry, numbness and headache.   Hematological: Negative for adenopathy. Does not bruise/bleed easily.   Psychiatric/Behavioral: Negative for agitation, hallucinations, sleep disturbance, suicidal ideas and depressed mood. The patient is not nervous/anxious.        Objective   Physical Exam  Vitals reviewed.   Constitutional:       General: She is not in acute distress.     Appearance: She is well-developed. She is not ill-appearing.   HENT:      Head: Normocephalic.      Right Ear: External ear normal.      Left Ear: External ear normal.      Nose: Nose normal.      Mouth/Throat:      Pharynx: No oropharyngeal exudate.   Eyes:      General: No scleral icterus.        Right eye: No discharge.         Left eye: No discharge.      Conjunctiva/sclera: Conjunctivae normal.      Pupils: Pupils are equal, round, and reactive to light.   Neck:      Thyroid: No thyroid mass or thyromegaly.   Cardiovascular:      Rate and Rhythm: Normal rate and regular rhythm.      Heart sounds: Normal heart sounds. No murmur heard.  Pulmonary:      Effort: Pulmonary effort is normal. No respiratory distress.      Breath sounds: Normal breath sounds. No wheezing or rales.   Chest:      Chest wall: No tenderness.   Abdominal:      General: Bowel sounds are normal. There is no distension.      Palpations: Abdomen is soft. There is no mass.      Tenderness: There is no abdominal tenderness. There is no guarding or rebound.      Hernia: No hernia is present.   Genitourinary:     Exam position: Prone.      Labia:         Right: No rash, tenderness or lesion.         Left: No rash, tenderness, lesion or injury.       Vagina: Normal. No vaginal discharge or tenderness.      Cervix: No cervical motion tenderness, discharge or friability.      Uterus: Not enlarged and not tender.       Adnexa:         Right: No mass or tenderness.          Left: No mass or tenderness.        Comments: Urethra and  "urethral meatus normal.    Bladder - normal, no prolapse.  Perineum and rectum examined - intact and no lesions.    Musculoskeletal:         General: No tenderness or deformity. Normal range of motion.      Cervical back: Normal range of motion and neck supple.   Lymphadenopathy:      Cervical: No cervical adenopathy.   Skin:     General: Skin is warm and dry.      Coloration: Skin is not pale.      Findings: No erythema or rash.   Neurological:      Mental Status: She is alert and oriented to person, place, and time.      Motor: No abnormal muscle tone.      Coordination: Coordination normal.      Deep Tendon Reflexes: Reflexes are normal and symmetric.   Psychiatric:         Behavior: Behavior normal. Behavior is cooperative.         Thought Content: Thought content normal.         Judgment: Judgment normal.           Vitals:    04/26/23 1450   BP: 128/90   BP Location: Left arm   Patient Position: Sitting   Cuff Size: Adult   Weight: 87.5 kg (193 lb)   Height: 170.2 cm (67\")       Diagnoses and all orders for this visit:    1. Encounter for well woman exam with routine gynecological exam (Primary)  Comments:  Normal well woman exam.  ThinPrep Pap smear done.  Mammogram ordered.    2. Encounter for screening mammogram for breast cancer  Comments:  Mammogram ordered.  Orders:  -     Mammo screening digital tomosynthesis bilateral w CAD; Future    3. Encounter for screening for cervical cancer  Comments:  ThinPrep Pap smear done.  Orders:  -     Liquid-based Pap Smear, Screening  -     HPV DNA Probe, Direct - ThinPrep Vial, Cervix    4. Female hirsutism  Comments:  Patient reports hirsutism.  Testosterone level done [id number] pending results.  Orders:  -     Testosterone    5. Pelvic pain  Comments:  Patient reports pelvic pain.  Exam unremarkable.  Pelvic ultrasound normal.        Normal GYN exam. Will have lab work here. Encouraged SBE.  Pt is aware how to do self breast exam and the importance of same. " Discussed weight management and importance of maintaining a healthy weight. Discussed Vitamin D intake and the importance of adequate vitamin D for both bone health and a healthy immune system.  Discussed daily exercise and the importance of same in regards to a healthy heart as well as helping to maintain her weight and improving her mental health.  Body mass index is 30.23 kg/m². Colonoscopy is not age appropriate.  Mammogram will be scheduled at not age appropriate. Pap smear is done per ASCCP guidelines.    BMI is >= 30 and <35. (Class 1 Obesity). The following options were offered after discussion;: exercise counseling/recommendations and nutrition counseling/recommendations             Non-Smoker    MyChart Instructions Given

## 2023-04-27 LAB — TESTOST SERPL-MCNC: 7 NG/DL (ref 4–50)

## 2023-04-28 LAB
GEN CATEG CVX/VAG CYTO-IMP: NORMAL
HPV I/H RISK 4 DNA CVX QL PROBE+SIG AMP: NOT DETECTED
LAB AP CASE REPORT: NORMAL
LAB AP GYN ADDITIONAL INFORMATION: NORMAL
Lab: NORMAL
PATH INTERP SPEC-IMP: NORMAL
STAT OF ADQ CVX/VAG CYTO-IMP: NORMAL

## 2023-05-12 ENCOUNTER — HOSPITAL ENCOUNTER (OUTPATIENT)
Dept: MAMMOGRAPHY | Facility: HOSPITAL | Age: 43
Discharge: HOME | End: 2023-05-12
Admitting: NURSE PRACTITIONER
Payer: COMMERCIAL

## 2023-05-12 DIAGNOSIS — Z12.31 ENCOUNTER FOR SCREENING MAMMOGRAM FOR BREAST CANCER: ICD-10-CM

## 2023-05-12 PROCEDURE — 77063 BREAST TOMOSYNTHESIS BI: CPT

## 2023-05-12 PROCEDURE — 77067 SCR MAMMO BI INCL CAD: CPT

## 2023-06-07 ENCOUNTER — TELEPHONE (OUTPATIENT)
Dept: OBSTETRICS AND GYNECOLOGY | Facility: CLINIC | Age: 43
End: 2023-06-07
Payer: COMMERCIAL

## 2023-06-07 NOTE — TELEPHONE ENCOUNTER
Provider: SHARON MATHEWS  Caller: LILI BENRAL   Relationship to Patient: SELF     Reason for Call: PT HAS AN APPT ON 6/9/23 FOR A U/S AND WILL BE AT THE END OF HER CYCLE SHE WILL BE LIGHT AND WOULD LIKE TO KNOW IF ITS OK TO STILL GET U/S DONE PLEASE CALL PT TO ADVISE UNABLE TO WT CALL VM IS OK  SHE IS GOING BACK TO WORK

## 2023-06-09 ENCOUNTER — OFFICE VISIT (OUTPATIENT)
Dept: OBSTETRICS AND GYNECOLOGY | Facility: CLINIC | Age: 43
End: 2023-06-09
Payer: COMMERCIAL

## 2023-06-09 VITALS
DIASTOLIC BLOOD PRESSURE: 90 MMHG | WEIGHT: 194 LBS | SYSTOLIC BLOOD PRESSURE: 128 MMHG | HEIGHT: 67 IN | BODY MASS INDEX: 30.45 KG/M2

## 2023-06-09 DIAGNOSIS — Z30.011 ENCOUNTER FOR INITIAL PRESCRIPTION OF CONTRACEPTIVE PILLS: ICD-10-CM

## 2023-06-09 DIAGNOSIS — R10.2 PELVIC PAIN: Primary | ICD-10-CM

## 2023-06-09 RX ORDER — NORETHINDRONE ACETATE AND ETHINYL ESTRADIOL, ETHINYL ESTRADIOL AND FERROUS FUMARATE 1MG-10(24)
1 KIT ORAL DAILY
Qty: 84 TABLET | Refills: 3 | Status: SHIPPED | OUTPATIENT
Start: 2023-06-09

## 2023-06-09 NOTE — PROGRESS NOTES
Subjective   Marcy Kennedy is a 43 y.o. female  YOB: 1980      Chief Complaint   Patient presents with   • Pelvic Pain     Patient here for pelvic pain. Patient had u/s in office today. Patient states that this month has not been as bad as last month.    • Contraception     Patient wants to discuss birth control        Pelvic Pain  The patient's pertinent negatives include no pelvic pain or vaginal discharge. Pertinent negatives include no abdominal pain, back pain, chills, constipation, diarrhea, dysuria, fever, flank pain, frequency, headaches, hematuria, nausea, rash, sore throat, urgency or vomiting.   Contraception  Pertinent negatives include no abdominal pain, arthralgias, chest pain, chills, congestion, coughing, diaphoresis, fatigue, fever, headaches, joint swelling, myalgias, nausea, neck pain, numbness, rash, sore throat, vomiting or weakness.       The following portions of the patient's history were reviewed and updated as appropriate: allergies, current medications, past family history, past medical history, past social history, past surgical history, and problem list.    Allergies   Allergen Reactions   • Aleve [Naproxen Sodium] Other (See Comments)     Blisters all over body per pt   • Aleve [Naproxen] Hives   • Metformin GI Intolerance     Severe stomach cramps, vomiting and no control of bowel movements at night       Past Medical History:   Diagnosis Date   • Anxiety    • Diabetes mellitus 2018   • Female infertility Unknown   • Hypertension    • PMS (premenstrual syndrome)    • Polycystic ovary syndrome    • Tubal pregnancy 2003    removed part of right tube   • Varicella 1983       Family History   Problem Relation Age of Onset   • Colon cancer Father    • Celiac disease Sister    • Diabetes Brother    • Celiac disease Brother    • Ovarian cancer Sister    • Uterine cancer Sister    • Breast cancer Neg Hx        Social History     Socioeconomic History   • Marital status:     Tobacco Use   • Smoking status: Never   • Smokeless tobacco: Never   Substance and Sexual Activity   • Alcohol use: No   • Drug use: No   • Sexual activity: Yes     Partners: Male     Birth control/protection: None         Current Outpatient Medications:   •  Continuous Blood Gluc Sensor (FreeStyle Bar 2 Sensor) misc, , Disp: , Rfl:   •  labetalol (NORMODYNE) 200 MG tablet, , Disp: , Rfl:   •  Levemir FlexTouch 100 UNIT/ML injection, , Disp: , Rfl:   •  NIFEdipine XL (PROCARDIA XL) 30 MG 24 hr tablet, Daily., Disp: , Rfl:   •  Prenat-FeAsp-Meth-FA-DHA w/o A (PRENATE DHA PO), , Disp: , Rfl:     Patient's last menstrual period was 06/02/2023.    Sexual History:         Could not be calculated    Past Surgical History:   Procedure Laterality Date   • COLONOSCOPY N/A 01/02/2020    Procedure: COLONOSCOPY WITH ANESTHESIA;  Surgeon: Roger Romero MD;  Location: [facility] ENDOSCOPY;  Service: Gastroenterology   • ECTOPIC PREGNANCY SURGERY Right 2003    removed part of right tube       Review of Systems   Constitutional: Negative for activity change, appetite change, chills, diaphoresis, fatigue, fever and unexpected weight change.   HENT: Negative for congestion, dental problem, drooling, ear discharge, ear pain, facial swelling, hearing loss, mouth sores, nosebleeds, postnasal drip, rhinorrhea, sinus pressure, sinus pain, sneezing, sore throat, tinnitus, trouble swallowing and voice change.    Eyes: Negative for photophobia, pain, discharge, redness, itching and visual disturbance.   Respiratory: Negative for apnea, cough, choking, chest tightness, shortness of breath, wheezing and stridor.    Cardiovascular: Negative for chest pain, palpitations and leg swelling.   Gastrointestinal: Negative for abdominal distention, abdominal pain, anal bleeding, blood in stool, constipation, diarrhea, nausea, rectal pain and vomiting.   Endocrine: Negative for cold intolerance, heat intolerance, polydipsia, polyphagia and  "polyuria.   Genitourinary: Negative for decreased urine volume, difficulty urinating, dyspareunia, dysuria, enuresis, flank pain, frequency, genital sores, hematuria, menstrual problem, pelvic pain, urgency, vaginal bleeding, vaginal discharge and vaginal pain.   Musculoskeletal: Negative for arthralgias, back pain, gait problem, joint swelling, myalgias, neck pain and neck stiffness.   Skin: Negative for color change, pallor, rash and wound.   Allergic/Immunologic: Negative for environmental allergies, food allergies and immunocompromised state.   Neurological: Negative for dizziness, tremors, seizures, syncope, facial asymmetry, speech difficulty, weakness, light-headedness, numbness and headaches.   Hematological: Negative for adenopathy. Does not bruise/bleed easily.   Psychiatric/Behavioral: Negative for agitation, behavioral problems, confusion, decreased concentration, dysphoric mood, hallucinations, self-injury, sleep disturbance and suicidal ideas. The patient is not nervous/anxious and is not hyperactive.        Objective   Physical Exam  Vitals and nursing note reviewed.   Constitutional:       Appearance: She is well-developed.   HENT:      Head: Normocephalic.   Eyes:      Pupils: Pupils are equal, round, and reactive to light.   Cardiovascular:      Rate and Rhythm: Normal rate and regular rhythm.   Pulmonary:      Effort: Pulmonary effort is normal.      Breath sounds: Normal breath sounds.   Abdominal:      Palpations: Abdomen is soft.   Musculoskeletal:         General: Normal range of motion.      Cervical back: Normal range of motion.   Skin:     General: Skin is warm and dry.   Neurological:      Mental Status: She is alert and oriented to person, place, and time.   Psychiatric:         Behavior: Behavior normal.           Vitals:    06/09/23 1416   BP: 128/90   BP Location: Right arm   Patient Position: Sitting   Cuff Size: Adult   Weight: 88 kg (194 lb)   Height: 170.2 cm (67\")       Diagnoses " and all orders for this visit:    1. Pelvic pain (Primary)  Comments:  States pain has been better for a month.  Worse between cycle and ovulation.  US done today is completely unremarkable.    2. Encounter for initial prescription of contraceptive pills  Comments:  Patient wants to start OCs at the suggestion of Dr. Guzman to transition patient from insulin to Ozempic before pregnancy.  RX for Lo Loestrin sent to pharmacy                        Non-Smoker    MyChart Instructions Given

## 2023-09-25 ENCOUNTER — OFFICE VISIT (OUTPATIENT)
Dept: OBSTETRICS AND GYNECOLOGY | Facility: CLINIC | Age: 43
End: 2023-09-25

## 2023-09-25 VITALS
HEIGHT: 67 IN | DIASTOLIC BLOOD PRESSURE: 90 MMHG | WEIGHT: 193 LBS | BODY MASS INDEX: 30.29 KG/M2 | SYSTOLIC BLOOD PRESSURE: 140 MMHG

## 2023-09-25 DIAGNOSIS — Z30.41 ENCOUNTER FOR SURVEILLANCE OF CONTRACEPTIVE PILLS: Primary | ICD-10-CM

## 2023-09-25 NOTE — PROGRESS NOTES
Subjective   Marcy Kennedy is a 43 y.o. female  YOB: 1980      Chief Complaint   Patient presents with    Follow-up     Pt here for follow up on OCP's. Pt states she is doing well with her OCP, states it has helped with her cycles, and would like to remain on it.        Contraception  This is a new problem. The current episode started more than 1 month ago. Pertinent negatives include no abdominal pain, arthralgias, chest pain, chills, congestion, coughing, diaphoresis, fatigue, fever, headaches, joint swelling, myalgias, nausea, neck pain, numbness, rash, sore throat, vomiting or weakness.     The following portions of the patient's history were reviewed and updated as appropriate: allergies, current medications, past family history, past medical history, past social history, past surgical history, and problem list.    Allergies   Allergen Reactions    Aleve [Naproxen Sodium] Other (See Comments)     Blisters all over body per pt    Aleve [Naproxen] Hives    Metformin GI Intolerance     Severe stomach cramps, vomiting and no control of bowel movements at night       Past Medical History:   Diagnosis Date    Anxiety     Diabetes mellitus 2018    Female infertility Unknown    Hypertension     PMS (premenstrual syndrome)     Polycystic ovary syndrome     Tubal pregnancy 2003    removed part of right tube    Varicella 1983       Family History   Problem Relation Age of Onset    Colon cancer Father     Celiac disease Sister     Diabetes Brother     Celiac disease Brother     Ovarian cancer Sister     Uterine cancer Sister     Breast cancer Neg Hx        Social History     Socioeconomic History    Marital status:    Tobacco Use    Smoking status: Never    Smokeless tobacco: Never   Substance and Sexual Activity    Alcohol use: No    Drug use: No    Sexual activity: Yes     Partners: Male     Birth control/protection: None         Current Outpatient Medications:     Continuous Blood Gluc Sensor  (FreeStyle Bar 2 Sensor) misc, , Disp: , Rfl:     labetalol (NORMODYNE) 200 MG tablet, , Disp: , Rfl:     Levemir FlexTouch 100 UNIT/ML injection, , Disp: , Rfl:     NIFEdipine XL (PROCARDIA XL) 30 MG 24 hr tablet, Daily., Disp: , Rfl:     Norethin-Eth Estrad-Fe Biphas (Lo Loestrin Fe) 1 MG-10 MCG / 10 MCG tablet, Take 1 tablet by mouth Daily., Disp: 84 tablet, Rfl: 3    Prenat-FeAsp-Meth-FA-DHA w/o A (PRENATE DHA PO), , Disp: , Rfl:     No LMP recorded.    Sexual History:         Could not be calculated    Past Surgical History:   Procedure Laterality Date    COLONOSCOPY N/A 01/02/2020    Procedure: COLONOSCOPY WITH ANESTHESIA;  Surgeon: Roger Romero MD;  Location: [facility] ENDOSCOPY;  Service: Gastroenterology    ECTOPIC PREGNANCY SURGERY Right 2003    removed part of right tube       Review of Systems   Constitutional:  Negative for activity change, appetite change, chills, diaphoresis, fatigue, fever and unexpected weight change.   HENT:  Negative for congestion, dental problem, drooling, ear discharge, ear pain, facial swelling, hearing loss, mouth sores, nosebleeds, postnasal drip, rhinorrhea, sinus pressure, sinus pain, sneezing, sore throat, tinnitus, trouble swallowing and voice change.    Eyes:  Negative for photophobia, pain, discharge, redness, itching and visual disturbance.   Respiratory:  Negative for apnea, cough, choking, chest tightness, shortness of breath, wheezing and stridor.    Cardiovascular:  Negative for chest pain, palpitations and leg swelling.   Gastrointestinal:  Negative for abdominal distention, abdominal pain, anal bleeding, blood in stool, constipation, diarrhea, nausea, rectal pain and vomiting.   Endocrine: Negative for cold intolerance, heat intolerance, polydipsia, polyphagia and polyuria.   Genitourinary:  Negative for decreased urine volume, difficulty urinating, dyspareunia, dysuria, enuresis, flank pain, frequency, genital sores, hematuria, menstrual problem, pelvic  "pain, urgency, vaginal bleeding, vaginal discharge and vaginal pain.   Musculoskeletal:  Negative for arthralgias, back pain, gait problem, joint swelling, myalgias, neck pain and neck stiffness.   Skin:  Negative for color change, pallor, rash and wound.   Allergic/Immunologic: Negative for environmental allergies, food allergies and immunocompromised state.   Neurological:  Negative for dizziness, tremors, seizures, syncope, facial asymmetry, speech difficulty, weakness, light-headedness, numbness and headaches.   Hematological:  Negative for adenopathy. Does not bruise/bleed easily.   Psychiatric/Behavioral:  Negative for agitation, behavioral problems, confusion, decreased concentration, dysphoric mood, hallucinations, self-injury, sleep disturbance and suicidal ideas. The patient is not nervous/anxious and is not hyperactive.      Objective   Physical Exam  Vitals and nursing note reviewed.   Constitutional:       Appearance: She is well-developed.   HENT:      Head: Normocephalic.   Eyes:      Pupils: Pupils are equal, round, and reactive to light.   Cardiovascular:      Rate and Rhythm: Normal rate and regular rhythm.   Pulmonary:      Effort: Pulmonary effort is normal.      Breath sounds: Normal breath sounds.   Abdominal:      Palpations: Abdomen is soft.   Musculoskeletal:         General: Normal range of motion.      Cervical back: Normal range of motion.   Skin:     General: Skin is warm and dry.   Neurological:      Mental Status: She is alert and oriented to person, place, and time.   Psychiatric:         Behavior: Behavior normal.         Vitals:    09/25/23 1055   BP: 140/90   Weight: 87.5 kg (193 lb)   Height: 170.2 cm (67\")       Diagnoses and all orders for this visit:    1. Encounter for surveillance of contraceptive pills (Primary)  Comments:  Patient doing well on low Loestrin and wants to remain on it.  Refill sent to pharmacy.  RTO for yearly or sooner as needed.                      "   Non-Smoker    MyChart Instructions Given

## 2024-01-10 ENCOUNTER — LAB (OUTPATIENT)
Dept: LAB | Facility: HOSPITAL | Age: 44
End: 2024-01-10
Payer: COMMERCIAL

## 2024-01-10 ENCOUNTER — TRANSCRIBE ORDERS (OUTPATIENT)
Dept: ADMINISTRATIVE | Facility: HOSPITAL | Age: 44
End: 2024-01-10
Payer: COMMERCIAL

## 2024-01-10 ENCOUNTER — HOSPITAL ENCOUNTER (OUTPATIENT)
Dept: GENERAL RADIOLOGY | Facility: HOSPITAL | Age: 44
Discharge: HOME | End: 2024-01-10
Payer: COMMERCIAL

## 2024-01-10 DIAGNOSIS — R06.09 OTHER FORMS OF DYSPNEA: ICD-10-CM

## 2024-01-10 DIAGNOSIS — J01.00 ACUTE MAXILLARY SINUSITIS, RECURRENCE NOT SPECIFIED: ICD-10-CM

## 2024-01-10 DIAGNOSIS — R06.09 OTHER FORMS OF DYSPNEA: Primary | ICD-10-CM

## 2024-01-10 LAB
ALBUMIN SERPL-MCNC: 4 G/DL (ref 3.5–5.2)
ALBUMIN/GLOB SERPL: 1.3 G/DL
ALP SERPL-CCNC: 69 U/L (ref 39–117)
ALT SERPL W P-5'-P-CCNC: 14 U/L (ref 1–33)
ANION GAP SERPL CALCULATED.3IONS-SCNC: 14.4 MMOL/L (ref 5–15)
AST SERPL-CCNC: 14 U/L (ref 1–32)
BASOPHILS # BLD AUTO: 0.02 10*3/MM3 (ref 0–0.2)
BASOPHILS NFR BLD AUTO: 0.2 % (ref 0–1.5)
BILIRUB SERPL-MCNC: 0.5 MG/DL (ref 0–1.2)
BUN SERPL-MCNC: 10 MG/DL (ref 6–20)
BUN/CREAT SERPL: 11.8 (ref 7–25)
CALCIUM SPEC-SCNC: 8.9 MG/DL (ref 8.6–10.5)
CHLORIDE SERPL-SCNC: 100 MMOL/L (ref 98–107)
CO2 SERPL-SCNC: 21.6 MMOL/L (ref 22–29)
CREAT SERPL-MCNC: 0.85 MG/DL (ref 0.57–1)
DEPRECATED RDW RBC AUTO: 39.4 FL (ref 37–54)
EGFRCR SERPLBLD CKD-EPI 2021: 87.3 ML/MIN/1.73
EOSINOPHIL # BLD AUTO: 0.15 10*3/MM3 (ref 0–0.4)
EOSINOPHIL NFR BLD AUTO: 1.8 % (ref 0.3–6.2)
ERYTHROCYTE [DISTWIDTH] IN BLOOD BY AUTOMATED COUNT: 12.5 % (ref 12.3–15.4)
GLOBULIN UR ELPH-MCNC: 3.2 GM/DL
GLUCOSE SERPL-MCNC: 269 MG/DL (ref 65–99)
HCT VFR BLD AUTO: 37.8 % (ref 34–46.6)
HGB BLD-MCNC: 13.3 G/DL (ref 12–15.9)
IMM GRANULOCYTES # BLD AUTO: 0.04 10*3/MM3 (ref 0–0.05)
IMM GRANULOCYTES NFR BLD AUTO: 0.5 % (ref 0–0.5)
LYMPHOCYTES # BLD AUTO: 1.59 10*3/MM3 (ref 0.7–3.1)
LYMPHOCYTES NFR BLD AUTO: 18.7 % (ref 19.6–45.3)
MCH RBC QN AUTO: 30.4 PG (ref 26.6–33)
MCHC RBC AUTO-ENTMCNC: 35.2 G/DL (ref 31.5–35.7)
MCV RBC AUTO: 86.5 FL (ref 79–97)
MONOCYTES # BLD AUTO: 0.62 10*3/MM3 (ref 0.1–0.9)
MONOCYTES NFR BLD AUTO: 7.3 % (ref 5–12)
NEUTROPHILS NFR BLD AUTO: 6.09 10*3/MM3 (ref 1.7–7)
NEUTROPHILS NFR BLD AUTO: 71.5 % (ref 42.7–76)
NRBC BLD AUTO-RTO: 0 /100 WBC (ref 0–0.2)
PLATELET # BLD AUTO: 244 10*3/MM3 (ref 140–450)
PMV BLD AUTO: 10.6 FL (ref 6–12)
POTASSIUM SERPL-SCNC: 3.9 MMOL/L (ref 3.5–5.2)
PROT SERPL-MCNC: 7.2 G/DL (ref 6–8.5)
RBC # BLD AUTO: 4.37 10*6/MM3 (ref 3.77–5.28)
SODIUM SERPL-SCNC: 136 MMOL/L (ref 136–145)
WBC NRBC COR # BLD AUTO: 8.51 10*3/MM3 (ref 3.4–10.8)

## 2024-01-10 PROCEDURE — 71046 X-RAY EXAM CHEST 2 VIEWS: CPT

## 2024-01-10 PROCEDURE — 85025 COMPLETE CBC W/AUTO DIFF WBC: CPT

## 2024-01-10 PROCEDURE — 80053 COMPREHEN METABOLIC PANEL: CPT

## 2024-01-10 PROCEDURE — 36415 COLL VENOUS BLD VENIPUNCTURE: CPT

## 2024-05-12 DIAGNOSIS — Z30.011 ENCOUNTER FOR INITIAL PRESCRIPTION OF CONTRACEPTIVE PILLS: ICD-10-CM

## 2024-05-13 RX ORDER — NORETHINDRONE ACETATE AND ETHINYL ESTRADIOL, ETHINYL ESTRADIOL AND FERROUS FUMARATE 1MG-10(24)
1 KIT ORAL DAILY
Qty: 24 TABLET | Refills: 0 | Status: SHIPPED | OUTPATIENT
Start: 2024-05-13

## 2024-05-22 ENCOUNTER — TRANSCRIBE ORDERS (OUTPATIENT)
Dept: ADMINISTRATIVE | Facility: HOSPITAL | Age: 44
End: 2024-05-22
Payer: COMMERCIAL

## 2024-05-22 DIAGNOSIS — R79.89 OTHER SPECIFIED ABNORMAL FINDINGS OF BLOOD CHEMISTRY: Primary | ICD-10-CM

## 2024-06-05 ENCOUNTER — HOSPITAL ENCOUNTER (OUTPATIENT)
Dept: ULTRASOUND IMAGING | Facility: HOSPITAL | Age: 44
Discharge: HOME | End: 2024-06-05
Admitting: INTERNAL MEDICINE
Payer: COMMERCIAL

## 2024-06-05 DIAGNOSIS — R79.89 OTHER SPECIFIED ABNORMAL FINDINGS OF BLOOD CHEMISTRY: ICD-10-CM

## 2024-06-05 PROCEDURE — 76705 ECHO EXAM OF ABDOMEN: CPT

## 2024-06-11 ENCOUNTER — TRANSCRIBE ORDERS (OUTPATIENT)
Dept: ADMINISTRATIVE | Facility: HOSPITAL | Age: 44
End: 2024-06-11
Payer: COMMERCIAL

## 2024-06-11 DIAGNOSIS — R10.12 LEFT UPPER QUADRANT PAIN: Primary | ICD-10-CM

## 2024-06-26 ENCOUNTER — HOSPITAL ENCOUNTER (OUTPATIENT)
Dept: CT IMAGING | Facility: HOSPITAL | Age: 44
Discharge: HOME | End: 2024-06-26
Admitting: INTERNAL MEDICINE
Payer: COMMERCIAL

## 2024-06-26 DIAGNOSIS — R10.12 LEFT UPPER QUADRANT PAIN: ICD-10-CM

## 2024-06-26 LAB — CREAT BLDA-MCNC: 0.8 MG/DL (ref 0.6–1.3)

## 2024-06-26 PROCEDURE — 25510000001 IOPAMIDOL 61 % SOLUTION: Performed by: INTERNAL MEDICINE

## 2024-06-26 PROCEDURE — 82565 ASSAY OF CREATININE: CPT

## 2024-06-26 PROCEDURE — 74160 CT ABDOMEN W/CONTRAST: CPT

## 2024-06-26 RX ADMIN — IOPAMIDOL 100 ML: 612 INJECTION, SOLUTION INTRAVENOUS at 08:52

## 2024-09-25 ENCOUNTER — OFFICE VISIT (OUTPATIENT)
Dept: OBSTETRICS AND GYNECOLOGY | Age: 44
End: 2024-09-25
Payer: COMMERCIAL

## 2024-09-25 VITALS
BODY MASS INDEX: 28.41 KG/M2 | SYSTOLIC BLOOD PRESSURE: 134 MMHG | HEIGHT: 67 IN | WEIGHT: 181 LBS | DIASTOLIC BLOOD PRESSURE: 92 MMHG

## 2024-09-25 DIAGNOSIS — Z12.4 CERVICAL CANCER SCREENING: ICD-10-CM

## 2024-09-25 DIAGNOSIS — Z12.31 ENCOUNTER FOR SCREENING MAMMOGRAM FOR MALIGNANT NEOPLASM OF BREAST: ICD-10-CM

## 2024-09-25 DIAGNOSIS — Z01.419 WELL WOMAN EXAM WITH ROUTINE GYNECOLOGICAL EXAM: Primary | ICD-10-CM

## 2024-09-25 PROCEDURE — 87624 HPV HI-RISK TYP POOLED RSLT: CPT | Performed by: NURSE PRACTITIONER

## 2024-09-25 PROCEDURE — G0123 SCREEN CERV/VAG THIN LAYER: HCPCS | Performed by: NURSE PRACTITIONER

## 2024-09-25 RX ORDER — SEMAGLUTIDE 0.68 MG/ML
INJECTION, SOLUTION SUBCUTANEOUS
COMMUNITY
Start: 2023-11-21

## 2024-09-25 NOTE — PROGRESS NOTES
Subjective   Marcy Kennedy is a 44 y.o. female  YOB: 1980        Chief Complaint   Patient presents with    Gynecologic Exam     Patient is here for annual well GYN Exam. Last well GYN exam and pap 4-26-23, WNL . Last Mammo 5-12-23.        Gynecologic Exam  The patient's pertinent negatives include no pelvic pain. Pertinent negatives include no abdominal pain, back pain, constipation, diarrhea, dysuria, fever, frequency, hematuria, nausea, rash, sore throat, urgency or vomiting.       The following portions of the patient's history were reviewed and updated as appropriate: allergies, current medications, past family history, past medical history, past social history, past surgical history, and problem list.    Allergies   Allergen Reactions    Aleve [Naproxen] Hives    Metformin GI Intolerance     Severe stomach cramps, vomiting and no control of bowel movements at night       Past Medical History:   Diagnosis Date    Anxiety     Diabetes mellitus 2018    Female infertility Unknown    Hypertension     PMS (premenstrual syndrome)     Polycystic ovary syndrome     Tubal pregnancy 2003    removed part of right tube    Varicella 1983       Family History   Problem Relation Age of Onset    Colon cancer Father     Celiac disease Sister     Diabetes Brother     Celiac disease Brother     Ovarian cancer Sister 33    Uterine cancer Sister 33    Breast cancer Neg Hx        Social History     Socioeconomic History    Marital status:    Tobacco Use    Smoking status: Never    Smokeless tobacco: Never   Substance and Sexual Activity    Alcohol use: No    Drug use: No    Sexual activity: Yes     Partners: Male     Birth control/protection: None         Current Outpatient Medications:     Continuous Blood Gluc Sensor (FreeStyle Bar 2 Sensor) misc, , Disp: , Rfl:     labetalol (NORMODYNE) 200 MG tablet, , Disp: , Rfl:     Levemir FlexTouch 100 UNIT/ML injection, , Disp: , Rfl:     NIFEdipine XL  (PROCARDIA XL) 30 MG 24 hr tablet, Daily., Disp: , Rfl:     Norethin-Eth Estrad-Fe Biphas (Lo Loestrin Fe) 1 MG-10 MCG / 10 MCG tablet, TAKE 1 TABLET BY MOUTH DAILY, Disp: 24 tablet, Rfl: 0    Prenat-FeAsp-Meth-FA-DHA w/o A (PRENATE DHA PO), , Disp: , Rfl:     Semaglutide,0.25 or 0.5MG/DOS, (Ozempic, 0.25 or 0.5 MG/DOSE,) 2 MG/3ML solution pen-injector, inject 0.5 mg once weekly as directed, Disp: , Rfl:     Patient's last menstrual period was 09/15/2024.    Sexual History:           Could not be calculated    Past Surgical History:   Procedure Laterality Date    COLONOSCOPY N/A 01/02/2020    Procedure: COLONOSCOPY WITH ANESTHESIA;  Surgeon: Roger Romero MD;  Location: [facility] ENDOSCOPY;  Service: Gastroenterology    ECTOPIC PREGNANCY SURGERY Right 2003    removed part of right tube       Review of Systems   Constitutional:  Negative for activity change, appetite change, fatigue, fever, unexpected weight gain and unexpected weight loss.   HENT:  Negative for congestion, ear pain, hearing loss, nosebleeds, rhinorrhea, sore throat, tinnitus and trouble swallowing.    Eyes:  Negative for blurred vision, pain, discharge, itching and visual disturbance.   Respiratory:  Negative for apnea, chest tightness, shortness of breath and wheezing.    Cardiovascular:  Negative for chest pain and leg swelling.   Gastrointestinal:  Negative for abdominal pain, blood in stool, constipation, diarrhea, nausea, vomiting and GERD.   Endocrine: Negative for heat intolerance, polydipsia and polyuria.   Genitourinary: Negative.  Negative for breast lump, decreased libido, difficulty urinating, dyspareunia, dysuria, frequency, genital sores, hematuria, menstrual problem, pelvic pain, urgency, urinary incontinence and vaginal pain.   Musculoskeletal:  Negative for arthralgias, back pain, joint swelling and myalgias.   Skin:  Negative for color change, rash and skin lesions.   Allergic/Immunologic: Negative for environmental allergies, food  allergies and immunocompromised state.   Neurological:  Negative for dizziness, tremors, seizures, syncope, facial asymmetry, numbness and headache.   Hematological:  Negative for adenopathy. Does not bruise/bleed easily.   Psychiatric/Behavioral:  Negative for agitation, hallucinations, sleep disturbance, suicidal ideas and depressed mood. The patient is not nervous/anxious.        Objective   Physical Exam  Vitals reviewed.   Constitutional:       General: She is not in acute distress.     Appearance: She is well-developed. She is not ill-appearing.   HENT:      Head: Normocephalic.      Right Ear: External ear normal.      Left Ear: External ear normal.      Nose: Nose normal.      Mouth/Throat:      Pharynx: No oropharyngeal exudate.   Eyes:      General: No scleral icterus.        Right eye: No discharge.         Left eye: No discharge.      Conjunctiva/sclera: Conjunctivae normal.      Pupils: Pupils are equal, round, and reactive to light.   Neck:      Thyroid: No thyroid mass or thyromegaly.   Cardiovascular:      Rate and Rhythm: Normal rate and regular rhythm.      Heart sounds: Normal heart sounds. No murmur heard.  Pulmonary:      Effort: Pulmonary effort is normal. No respiratory distress.      Breath sounds: Normal breath sounds. No wheezing or rales.   Chest:      Chest wall: No tenderness.   Abdominal:      General: Bowel sounds are normal. There is no distension.      Palpations: Abdomen is soft. There is no mass.      Tenderness: There is no abdominal tenderness. There is no guarding or rebound.      Hernia: No hernia is present.   Genitourinary:     Exam position: Prone.      Labia:         Right: No rash, tenderness or lesion.         Left: No rash, tenderness, lesion or injury.       Vagina: Normal. No vaginal discharge or tenderness.      Cervix: No cervical motion tenderness, discharge or friability.      Uterus: Not enlarged and not tender.       Adnexa:         Right: No mass or tenderness.   "        Left: No mass or tenderness.        Comments: Urethra and urethral meatus normal.    Bladder - normal, no prolapse.  Perineum and rectum examined - intact and no lesions.    Musculoskeletal:         General: No tenderness or deformity. Normal range of motion.      Cervical back: Normal range of motion and neck supple.   Lymphadenopathy:      Cervical: No cervical adenopathy.   Skin:     General: Skin is warm and dry.      Coloration: Skin is not pale.      Findings: No erythema or rash.   Neurological:      Mental Status: She is alert and oriented to person, place, and time.      Motor: No abnormal muscle tone.      Coordination: Coordination normal.      Deep Tendon Reflexes: Reflexes are normal and symmetric.   Psychiatric:         Behavior: Behavior normal. Behavior is cooperative.         Thought Content: Thought content normal.         Judgment: Judgment normal.           Vitals:    09/25/24 1500   BP: 134/92   Weight: 82.1 kg (181 lb)   Height: 170.2 cm (67\")       Diagnoses and all orders for this visit:    1. Well woman exam with routine gynecological exam (Primary)  Comments:  Normal well woman exam.  ThinPrep Pap smear done.  Mammogram ordered.  Orders:  -     Liquid-based Pap Smear, Screening  -     HPV DNA Probe, Direct - ThinPrep Vial, Cervix, Endocervix    2. Cervical cancer screening  -     Liquid-based Pap Smear, Screening  -     HPV DNA Probe, Direct - ThinPrep Vial, Cervix, Endocervix    3. Encounter for screening mammogram for malignant neoplasm of breast  -     Mammo screening digital tomosynthesis bilateral w CAD        Normal GYN exam. Will have lab work here. Encouraged SBE.  Pt is aware how to do self breast exam and the importance of same. Discussed weight management and importance of maintaining a healthy weight. Discussed Vitamin D intake and the importance of adequate vitamin D for both bone health and a healthy immune system.  Discussed daily exercise and the importance of same " in regards to a healthy heart as well as helping to maintain her weight and improving her mental health.  Body mass index is 28.35 kg/m². Colonoscopy is not age appropriate.  Mammogram will be scheduled at [facility]. Pap smear is done per [facility]P guidelines.    BMI is >= 25 and <30. (Overweight) The following options were offered after discussion;: exercise counseling/recommendations and nutrition counseling/recommendations             Non-Smoker    MyChart Instructions Given

## 2024-12-01 LAB
NCCN CRITERIA FLAG: ABNORMAL
TYRER CUZICK SCORE: 12.4

## 2024-12-04 ENCOUNTER — HOSPITAL ENCOUNTER (OUTPATIENT)
Dept: MAMMOGRAPHY | Facility: HOSPITAL | Age: 44
Discharge: HOME | End: 2024-12-04
Admitting: NURSE PRACTITIONER
Payer: COMMERCIAL

## 2024-12-04 PROCEDURE — 77063 BREAST TOMOSYNTHESIS BI: CPT

## 2024-12-04 PROCEDURE — 77067 SCR MAMMO BI INCL CAD: CPT

## 2024-12-12 ENCOUNTER — DOCUMENTATION (OUTPATIENT)
Dept: GENETICS | Facility: HOSPITAL | Age: 44
End: 2024-12-12
Payer: COMMERCIAL

## (undated) DEVICE — TBG SMPL FLTR LINE NASL 02/C02 A/ BX/100

## (undated) DEVICE — SNAR POLYP SENSATION MICRO OVL 13 240X40

## (undated) DEVICE — THE SINGLE USE ETRAP – POLYP TRAP IS USED FOR SUCTION RETRIEVAL OF ENDOSCOPICALLY REMOVED POLYPS.: Brand: ETRAP

## (undated) DEVICE — Device: Brand: DEFENDO AIR/WATER/SUCTION AND BIOPSY VALVE

## (undated) DEVICE — MASK,OXYGEN,MED CONC,ADLT,7' TUB, UC: Brand: PENDING

## (undated) DEVICE — YANKAUER,BULB TIP WITH VENT: Brand: ARGYLE

## (undated) DEVICE — THE CHANNEL CLEANING BRUSH IS A NYLON FLEXI BRUSH ATTACHED TO A FLEXIBLE PLASTIC SHEATH DESIGNED TO SAFELY REMOVE DEBRIS FROM FLEXIBLE ENDOSCOPES.

## (undated) DEVICE — ENDOGATOR AUXILIARY WATER JET CONNECTOR: Brand: ENDOGATOR

## (undated) DEVICE — CUFF,BP,DISP,1 TUBE,ADULT,HP: Brand: MEDLINE

## (undated) DEVICE — SENSR O2 OXIMAX FNGR A/ 18IN NONSTR